# Patient Record
Sex: MALE | Race: OTHER | NOT HISPANIC OR LATINO | URBAN - METROPOLITAN AREA
[De-identification: names, ages, dates, MRNs, and addresses within clinical notes are randomized per-mention and may not be internally consistent; named-entity substitution may affect disease eponyms.]

---

## 2019-02-01 VITALS
RESPIRATION RATE: 17 BRPM | HEIGHT: 66 IN | TEMPERATURE: 98 F | SYSTOLIC BLOOD PRESSURE: 116 MMHG | OXYGEN SATURATION: 96 % | HEART RATE: 85 BPM | DIASTOLIC BLOOD PRESSURE: 69 MMHG | WEIGHT: 235.01 LBS

## 2019-02-01 NOTE — H&P ADULT - ASSESSMENT
52 y/o male with PMHx of HTN, HLD, pAfib (s/p DCCV in 2018, on Eliquis, last dose 2/2/19), CAD (last cath @ [facility] on 1/30/19 s/p PTCA to p/m LAD ISR), DM II, hypothyroidism (on synthroid) who presented to Cardiologist Dr. Ivan c/o worsening unstable angina symptoms x 3months. In light of pt's risk factors, CCS Class III Anginal symptoms, severe recurrent ISR of p/m LAD, pt referred for laser atherectomy with possible brachytherapy at [facility].     ASA Class: III	 	Mallampatti Class: III    - NKDA  - Meds verified with pharmacy   - pre-cath hydration   - ISS/FS  - 325mg ASA + 75mg Plavix given (pt reported being compliant with all daily medications   - Risks & benefits of procedure and alternative therapy have been explained to the patient including but not limited to: allergic reaction, bleeding w/possible need for blood transfusion, infection, renal and vascular compromise, limb damage, arrhythmia, stroke, vessel dissection/perforation, Myocardial infarction, emergent CABG. Informed consent obtained and in chart. 54 y/o male with PMHx of HTN, HLD, pAfib (s/p DCCV in 2018, on Eliquis, last dose 2/2/19), CAD (last cath @ [facility] on 1/30/19 s/p PTCA to p/m LAD ISR), DM II, hypothyroidism (on synthroid) who presented to Cardiologist Dr. Ivan c/o worsening unstable angina symptoms x 3months. Cardiac cath @ [place] (1/30/19): LVEF 65%; pLAD 90% ISR (culprit), mLAD 85% at site of prior stent (culprit), mLCx 30%, s/p PTCA of p/m LAD. In light of pt's risk factors, CCS Class III Anginal symptoms, severe recurrent ISR of p/m LAD, pt referred to for laser atherectomy with possible brachytherapy at [facility].     ASA Class: III	 	Mallampatti Class: III    - NKDA  - Meds verified with pharmacy   - pre-cath hydration   - ISS/FS  - 325mg ASA + 75mg Plavix given (pt reported being compliant with all daily medications   - Risks & benefits of procedure and alternative therapy have been explained to the patient including but not limited to: allergic reaction, bleeding w/possible need for blood transfusion, infection, renal and vascular compromise, limb damage, arrhythmia, stroke, vessel dissection/perforation, Myocardial infarction, emergent CABG. Informed consent obtained and in chart. 52 y/o male with PMHx of HTN, HLD, pAfib (s/p DCCV in 2018, on Eliquis, last dose 2/2/19), CAD (last cath @ [facility] on 1/30/19 s/p PTCA to p/m LAD ISR), DM II, hypothyroidism (on synthroid) who presented to Cardiologist Dr. Ivan c/o worsening unstable angina symptoms x 3months. Cardiac cath @ [place] (1/30/19): LVEF 65%; pLAD 90% ISR, mLAD 85% at site of prior stent, mLCx 30%, s/p PTCA of p/m LAD. In light of pt's risk factors, CCS Class III Anginal symptoms, severe recurrent ISR of p/m LAD, pt referred to for laser atherectomy with possible brachytherapy at [facility].     ASA Class: III	 	Mallampatti Class: III    - NKDA  - Meds verified with pharmacy   - pre-cath hydration   - ISS/FS  - 325mg ASA + 75mg Plavix given (Eliquis last dose on 2/3/19). Patient reports being compliant with all daily medications.  - Risks & benefits of procedure and alternative therapy have been explained to the patient including but not limited to: allergic reaction, bleeding w/possible need for blood transfusion, infection, renal and vascular compromise, limb damage, arrhythmia, stroke, vessel dissection/perforation, Myocardial infarction, emergent CABG. Informed consent obtained and in chart.

## 2019-02-01 NOTE — H&P ADULT - NSHPSOCIALHISTORY_GEN_ALL_CORE
Former smoker (quit 7/2014)  Drinks socially  Denies elicit drug use Current smoker (quit 13 years ago, couple months, few cigarettes per day)  Drinks socially (on weekends, last drink New Years abiodun)  Denies elicit drug use

## 2019-02-01 NOTE — H&P ADULT - NSHPLABSRESULTS_GEN_ALL_CORE
EKG: NSR @ 84bpm. Normal Axis. RBBB, Flattened T waves + J point elevations in leads II/III/AVF. TWI in leads V2.                          13.1   6.7   )-----------( 289      ( 05 Feb 2019 07:32 )             40.0   02-05    137  |  103  |  26<H>  ----------------------------<  369<H>  4.7   |  21<L>  |  0.96    Ca    9.5      05 Feb 2019 07:32    TPro  7.7  /  Alb  4.2  /  TBili  0.3  /  DBili  x   /  AST  16  /  ALT  19  /  AlkPhos  70  02-05    PT/INR - ( 05 Feb 2019 07:32 )   PT: 11.2 sec;   INR: 0.99          PTT - ( 05 Feb 2019 07:32 )  PTT:27.8 sec

## 2019-02-01 NOTE — H&P ADULT - FAMILY HISTORY
Father  Still living? Unknown  Family history of heart failure, Age at diagnosis: Age Unknown Father  Still living? Unknown  Family history of heart failure, Age at diagnosis: Age Unknown  Family history of myocardial infarction, Age at diagnosis: 61-70

## 2019-02-01 NOTE — H&P ADULT - HISTORY OF PRESENT ILLNESS
**VERIFY MEDS  **Verify medication    52 yo male, FORMER SMOKER,  with PMHx of HTN, pAfib (on Eliquis), CAD, pSVT, DM who presented to Cardiologist Dr. Ivan with c/o non-radiating SSCP described as ___ and of _/10 intensity with associated BLANCHARD when walking short distance in very cold weather over past few months. Pt also c/o BLANCHARD upon ambulation of 1 flight of steps, resolving with rest, over past 6 months.    Cath hx:  Cardiac cath @ [place] on 1/30/19: pLAD 90% ISR (culprit), mLAD 85% at site of prior stent (culprit), mLCx 30%, LVEF 65% and pt with history of recurrent ISR of prox and middle LAD stents which were last treated with PTCA 9/2018 with plan for laser atherectomy with possible brachytherapy at [facility]    Cardiac cath @ [place] 9/7/18: pLAD 95% ISR --> 0%, post intervention lumen 5.5-6 mm2, mLAD 90% s/p WALLY to 0% **Verify medications    54 yo male with PMHx of HTN, HLD, pAfib (s/p DCCV in 2018, on Eliquis, last dose 2/2/19), CAD (last cath @ [facility] on 1/30/19 s/p PTCA to p/m LAD ISR), DM II, hypothyroidism (on synthroid) who presented to Cardiologist Dr. Ivan with c/o non-radiating SSCP described as pressure and of moderate intensity with associated LBANCHARD when walking <3 city blocks in very cold weather over past couple months. Pt also c/o non-radiating SSCP described as pressure but of mild intensity with associated BLANCHARD upon ambulation of 1 flight of steps, resolving with rest, over past 6 months.  Furthermore, pt endorses intermittent episodes of lightheadedness and feeling off balance when standing up quickly and feeling of generalized fatigue x several months. Denies diaphoresis, palpitations, LE edema, orthopnea, PND, syncope, N/V, abdominal pain.  In light of pt's risk factors, CCS Class III Anginal symptoms, severe recurrent ISR of p/m LAD, pt referred for laser atherectomy with possible brachytherapy at [facility].     Cath hx:  Cardiac cath @ [place] on 1/30/19: pLAD 90% ISR (culprit), mLAD 85% at site of prior stent (culprit), mLCx 30%, s/p PTCA of p/m LAD, LVEF 65%. Plan for laser atherectomy with possible brachytherapy at [facility]    Cardiac cath @ [place] 9/7/18: pLAD 95% ISR --> 0%, post intervention lumen 5.5-6 mm2, mLAD 90% s/p WALLY to 0% 54 y/o male with PMHx of HTN, HLD, pAfib (s/p DCCV in 2018, on Eliquis, last dose 2/2/19), CAD (last cath @ [facility] on 1/30/19 s/p PTCA to p/m LAD ISR), DM II, hypothyroidism (on synthroid) who presented to Cardiologist Dr. Ivan with c/o non-radiating SSCP described as pressure and of moderate intensity with associated BLANCHARD when walking <3 city blocks in very cold weather over past couple months. Pt also c/o non-radiating SSCP described as pressure but of mild intensity with associated BLANCHARD upon ambulation of 1 flight of steps, resolving with rest, over past 6 months.  Furthermore, pt endorses intermittent episodes of lightheadedness and feeling off balance when standing up quickly and feeling of generalized fatigue x several months. Denies diaphoresis, palpitations, LE edema, orthopnea, PND, syncope, N/V, abdominal pain.  In light of pt's risk factors, CCS Class III Anginal symptoms, severe recurrent ISR of p/m LAD, pt referred for laser atherectomy with possible brachytherapy at [facility].     Cath hx:  Cardiac cath @ [place] on 1/30/19: pLAD 90% ISR (culprit), mLAD 85% at site of prior stent (culprit), mLCx 30%, s/p PTCA of p/m LAD, LVEF 65%. Plan for laser atherectomy with possible brachytherapy at [facility]    Cardiac cath @ [place] 9/7/18: pLAD 95% ISR --> 0%, post intervention lumen 5.5-6 mm2, mLAD 90% s/p WALLY to 0% 54 y/o male with PMHx of HTN, HLD, pAfib (s/p DCCV in 2018, on Eliquis, last dose 2/2/19), CAD (last cath @ [facility] on 1/30/19 s/p PTCA to p/m LAD ISR), DM II, hypothyroidism (on synthroid) who presented to Cardiologist Dr. Ivan c/o worsening unstable angina x 1 month. Pt reports having moderate, intermittent, non-radiating, mid-sternal chest "pressure" with associated BLANCHARD. Pt reports he is able to when walk only <3 city blocks + up 1 flight of stairs in very cold weather before his symptoms start, but are relieved/do not occur with rest.  Furthermore, pt endorses intermittent episodes of lightheadedness/feeling off balance when standing up quickly and generalized fatigue x several months. Pt reported having CP this morning, took his Nitro SL at 8am with relief of chest pain. Denies diaphoresis, palpitations, BLE edema/pain, orthopnea, PND, syncope, N/V, and abdominal pain. In light of pt's risk factors, CCS Class III Anginal symptoms, severe recurrent ISR of p/m LAD, pt referred to [facility] for laser atherectomy with possible brachytherapy.    Cath hx:  Cardiac cath @ [place] (1/30/19): LVEF 65%, pLAD 90% ISR (culprit), mLAD 85% at site of prior stent (culprit), mLCx 30% s/p PTCA of p/m LAD.    Cardiac cath @ [place] (9/7/18): pLAD 95% ISR --> 0%, post intervention lumen 5.5-6 mm2, mLAD 90% s/p WALLY to 0%

## 2019-02-01 NOTE — H&P ADULT - PMH
Afib    CAD (coronary artery disease)    DM (diabetes mellitus)    HTN (hypertension)    SVT (supraventricular tachycardia) Afib    CAD (coronary artery disease)    DM (diabetes mellitus)    HTN (hypertension) Afib    CAD (coronary artery disease)    DM (diabetes mellitus)    HLD (hyperlipidemia)    HTN (hypertension)    Hypothyroid

## 2019-02-01 NOTE — H&P ADULT - PSH
Amputation of finger, initial encounter  Traumatic amputation of thumb in 2014  Mandible fracture  s/p surgery Amputation of finger, initial encounter  Traumatic amputation of thumb in 2014  Mandible fracture  s/p surgery  S/P drug eluting coronary stent placement

## 2019-02-01 NOTE — H&P ADULT - ATTENDING COMMENTS
Reviewed above documentation. Reviewed vitals, labs, medications, cardiac studies and additional imaging 1/5/19. Agree with the above with the following additions/amendments:  53M with Essential HTN, HLD, pAfib, CAD, DM II, hypothyroidism (on synthroid) who presents for [facility] in context of anginal pectoris. Pt with severe recurrent ISR of p/m LAD, pt referred for laser atherectomy with possible brachytherapy at [facility]. Paitent s/p MARTIN LAD without complication. Groin stable. HDS, asx.     Plan for:  Triple tx with ASA/Plavix/Eliquis x1 mo  -->then continue Plavix Eliquis  High intensity statin Atorva   Imdur antianginal  Order HbA1c, TFTs, FLP  Patient to be referred to cardiac rehabilitation upon discharge for cardiac conditioning    ASHLEY Peralta.  Cardiology Attending

## 2019-02-05 ENCOUNTER — INPATIENT (INPATIENT)
Facility: HOSPITAL | Age: 54
LOS: 0 days | Discharge: HOME | DRG: 247 | End: 2019-02-06
Attending: INTERNAL MEDICINE | Admitting: INTERNAL MEDICINE
Payer: COMMERCIAL

## 2019-02-05 DIAGNOSIS — S68.119A COMPLETE TRAUMATIC METACARPOPHALANGEAL AMPUTATION OF UNSPECIFIED FINGER, INITIAL ENCOUNTER: Chronic | ICD-10-CM

## 2019-02-05 DIAGNOSIS — Z95.5 PRESENCE OF CORONARY ANGIOPLASTY IMPLANT AND GRAFT: Chronic | ICD-10-CM

## 2019-02-05 DIAGNOSIS — S02.609A FRACTURE OF MANDIBLE, UNSPECIFIED, INITIAL ENCOUNTER FOR CLOSED FRACTURE: Chronic | ICD-10-CM

## 2019-02-05 LAB
ALBUMIN SERPL ELPH-MCNC: 4.2 G/DL — SIGNIFICANT CHANGE UP (ref 3.3–5)
ALP SERPL-CCNC: 70 U/L — SIGNIFICANT CHANGE UP (ref 40–120)
ALT FLD-CCNC: 19 U/L — SIGNIFICANT CHANGE UP (ref 10–45)
ANION GAP SERPL CALC-SCNC: 13 MMOL/L — SIGNIFICANT CHANGE UP (ref 5–17)
APTT BLD: 27.8 SEC — SIGNIFICANT CHANGE UP (ref 27.5–36.3)
AST SERPL-CCNC: 16 U/L — SIGNIFICANT CHANGE UP (ref 10–40)
BASOPHILS NFR BLD AUTO: 0.3 % — SIGNIFICANT CHANGE UP (ref 0–2)
BILIRUB SERPL-MCNC: 0.3 MG/DL — SIGNIFICANT CHANGE UP (ref 0.2–1.2)
BUN SERPL-MCNC: 26 MG/DL — HIGH (ref 7–23)
CALCIUM SERPL-MCNC: 9.5 MG/DL — SIGNIFICANT CHANGE UP (ref 8.4–10.5)
CHLORIDE SERPL-SCNC: 103 MMOL/L — SIGNIFICANT CHANGE UP (ref 96–108)
CHOLEST SERPL-MCNC: 124 MG/DL — SIGNIFICANT CHANGE UP (ref 10–199)
CK MB CFR SERPL CALC: 2.4 NG/ML — SIGNIFICANT CHANGE UP (ref 0–6.7)
CK SERPL-CCNC: 96 U/L — SIGNIFICANT CHANGE UP (ref 30–200)
CO2 SERPL-SCNC: 21 MMOL/L — LOW (ref 22–31)
CREAT SERPL-MCNC: 0.96 MG/DL — SIGNIFICANT CHANGE UP (ref 0.5–1.3)
CRP SERPL-MCNC: 0.14 MG/DL — SIGNIFICANT CHANGE UP (ref 0–0.4)
EOSINOPHIL NFR BLD AUTO: 2.1 % — SIGNIFICANT CHANGE UP (ref 0–6)
GLUCOSE BLDC GLUCOMTR-MCNC: 230 MG/DL — HIGH (ref 70–99)
GLUCOSE SERPL-MCNC: 369 MG/DL — HIGH (ref 70–99)
HCT VFR BLD CALC: 40 % — SIGNIFICANT CHANGE UP (ref 39–50)
HDLC SERPL-MCNC: 32 MG/DL — LOW
HGB BLD-MCNC: 13.1 G/DL — SIGNIFICANT CHANGE UP (ref 13–17)
INR BLD: 0.99 — SIGNIFICANT CHANGE UP (ref 0.88–1.16)
LIPID PNL WITH DIRECT LDL SERPL: 62 MG/DL — SIGNIFICANT CHANGE UP
LYMPHOCYTES # BLD AUTO: 24.1 % — SIGNIFICANT CHANGE UP (ref 13–44)
MCHC RBC-ENTMCNC: 25 PG — LOW (ref 27–34)
MCHC RBC-ENTMCNC: 32.8 G/DL — SIGNIFICANT CHANGE UP (ref 32–36)
MCV RBC AUTO: 76.5 FL — LOW (ref 80–100)
MONOCYTES NFR BLD AUTO: 6.8 % — SIGNIFICANT CHANGE UP (ref 2–14)
NEUTROPHILS NFR BLD AUTO: 66.7 % — SIGNIFICANT CHANGE UP (ref 43–77)
PLATELET # BLD AUTO: 289 K/UL — SIGNIFICANT CHANGE UP (ref 150–400)
POTASSIUM SERPL-MCNC: 4.7 MMOL/L — SIGNIFICANT CHANGE UP (ref 3.5–5.3)
POTASSIUM SERPL-SCNC: 4.7 MMOL/L — SIGNIFICANT CHANGE UP (ref 3.5–5.3)
PROT SERPL-MCNC: 7.7 G/DL — SIGNIFICANT CHANGE UP (ref 6–8.3)
PROTHROM AB SERPL-ACNC: 11.2 SEC — SIGNIFICANT CHANGE UP (ref 10–12.9)
RBC # BLD: 5.23 M/UL — SIGNIFICANT CHANGE UP (ref 4.2–5.8)
RBC # FLD: 13.9 % — SIGNIFICANT CHANGE UP (ref 10.3–16.9)
SODIUM SERPL-SCNC: 137 MMOL/L — SIGNIFICANT CHANGE UP (ref 135–145)
TOTAL CHOLESTEROL/HDL RATIO MEASUREMENT: 3.9 RATIO — SIGNIFICANT CHANGE UP (ref 3.4–9.6)
TRIGL SERPL-MCNC: 151 MG/DL — HIGH (ref 10–149)
WBC # BLD: 6.7 K/UL — SIGNIFICANT CHANGE UP (ref 3.8–10.5)
WBC # FLD AUTO: 6.7 K/UL — SIGNIFICANT CHANGE UP (ref 3.8–10.5)

## 2019-02-05 PROCEDURE — 99222 1ST HOSP IP/OBS MODERATE 55: CPT

## 2019-02-05 PROCEDURE — 92928 PRQ TCAT PLMT NTRAC ST 1 LES: CPT | Mod: LD

## 2019-02-05 PROCEDURE — 92978 ENDOLUMINL IVUS OCT C 1ST: CPT | Mod: 26

## 2019-02-05 PROCEDURE — 76937 US GUIDE VASCULAR ACCESS: CPT | Mod: 26

## 2019-02-05 PROCEDURE — 93454 CORONARY ARTERY ANGIO S&I: CPT | Mod: 26,XU

## 2019-02-05 RX ORDER — ISOSORBIDE MONONITRATE 60 MG/1
30 TABLET, EXTENDED RELEASE ORAL DAILY
Qty: 0 | Refills: 0 | Status: DISCONTINUED | OUTPATIENT
Start: 2019-02-06 | End: 2019-02-06

## 2019-02-05 RX ORDER — SODIUM CHLORIDE 9 MG/ML
1000 INJECTION, SOLUTION INTRAVENOUS
Qty: 0 | Refills: 0 | Status: DISCONTINUED | OUTPATIENT
Start: 2019-02-05 | End: 2019-02-06

## 2019-02-05 RX ORDER — DEXTROSE 50 % IN WATER 50 %
25 SYRINGE (ML) INTRAVENOUS ONCE
Qty: 0 | Refills: 0 | Status: DISCONTINUED | OUTPATIENT
Start: 2019-02-05 | End: 2019-02-06

## 2019-02-05 RX ORDER — DEXTROSE 50 % IN WATER 50 %
25 SYRINGE (ML) INTRAVENOUS ONCE
Qty: 0 | Refills: 0 | Status: DISCONTINUED | OUTPATIENT
Start: 2019-02-05 | End: 2019-02-05

## 2019-02-05 RX ORDER — ASPIRIN/CALCIUM CARB/MAGNESIUM 324 MG
81 TABLET ORAL DAILY
Qty: 0 | Refills: 0 | Status: DISCONTINUED | OUTPATIENT
Start: 2019-02-06 | End: 2019-02-06

## 2019-02-05 RX ORDER — GLUCAGON INJECTION, SOLUTION 0.5 MG/.1ML
1 INJECTION, SOLUTION SUBCUTANEOUS ONCE
Qty: 0 | Refills: 0 | Status: DISCONTINUED | OUTPATIENT
Start: 2019-02-05 | End: 2019-02-05

## 2019-02-05 RX ORDER — DILTIAZEM HCL 120 MG
120 CAPSULE, EXT RELEASE 24 HR ORAL DAILY
Qty: 0 | Refills: 0 | Status: DISCONTINUED | OUTPATIENT
Start: 2019-02-06 | End: 2019-02-06

## 2019-02-05 RX ORDER — CLOPIDOGREL BISULFATE 75 MG/1
75 TABLET, FILM COATED ORAL ONCE
Qty: 0 | Refills: 0 | Status: COMPLETED | OUTPATIENT
Start: 2019-02-05 | End: 2019-02-05

## 2019-02-05 RX ORDER — CHLORHEXIDINE GLUCONATE 213 G/1000ML
1 SOLUTION TOPICAL ONCE
Qty: 0 | Refills: 0 | Status: DISCONTINUED | OUTPATIENT
Start: 2019-02-05 | End: 2019-02-05

## 2019-02-05 RX ORDER — GLUCAGON INJECTION, SOLUTION 0.5 MG/.1ML
1 INJECTION, SOLUTION SUBCUTANEOUS ONCE
Qty: 0 | Refills: 0 | Status: DISCONTINUED | OUTPATIENT
Start: 2019-02-05 | End: 2019-02-06

## 2019-02-05 RX ORDER — DEXTROSE 50 % IN WATER 50 %
12.5 SYRINGE (ML) INTRAVENOUS ONCE
Qty: 0 | Refills: 0 | Status: DISCONTINUED | OUTPATIENT
Start: 2019-02-05 | End: 2019-02-06

## 2019-02-05 RX ORDER — INSULIN LISPRO 100/ML
VIAL (ML) SUBCUTANEOUS
Qty: 0 | Refills: 0 | Status: DISCONTINUED | OUTPATIENT
Start: 2019-02-05 | End: 2019-02-06

## 2019-02-05 RX ORDER — LEVOTHYROXINE SODIUM 125 MCG
50 TABLET ORAL DAILY
Qty: 0 | Refills: 0 | Status: DISCONTINUED | OUTPATIENT
Start: 2019-02-05 | End: 2019-02-06

## 2019-02-05 RX ORDER — METFORMIN HYDROCHLORIDE 850 MG/1
2 TABLET ORAL
Qty: 0 | Refills: 0 | COMMUNITY

## 2019-02-05 RX ORDER — ZOLPIDEM TARTRATE 10 MG/1
5 TABLET ORAL AT BEDTIME
Qty: 0 | Refills: 0 | Status: DISCONTINUED | OUTPATIENT
Start: 2019-02-05 | End: 2019-02-06

## 2019-02-05 RX ORDER — APIXABAN 2.5 MG/1
0 TABLET, FILM COATED ORAL
Qty: 0 | Refills: 0 | COMMUNITY

## 2019-02-05 RX ORDER — SODIUM CHLORIDE 9 MG/ML
1000 INJECTION, SOLUTION INTRAVENOUS
Qty: 0 | Refills: 0 | Status: DISCONTINUED | OUTPATIENT
Start: 2019-02-05 | End: 2019-02-05

## 2019-02-05 RX ORDER — SOTALOL HCL 120 MG
160 TABLET ORAL
Qty: 0 | Refills: 0 | Status: DISCONTINUED | OUTPATIENT
Start: 2019-02-06 | End: 2019-02-06

## 2019-02-05 RX ORDER — PANTOPRAZOLE SODIUM 20 MG/1
40 TABLET, DELAYED RELEASE ORAL
Qty: 0 | Refills: 0 | Status: DISCONTINUED | OUTPATIENT
Start: 2019-02-05 | End: 2019-02-06

## 2019-02-05 RX ORDER — APIXABAN 2.5 MG/1
5 TABLET, FILM COATED ORAL EVERY 12 HOURS
Qty: 0 | Refills: 0 | Status: DISCONTINUED | OUTPATIENT
Start: 2019-02-05 | End: 2019-02-06

## 2019-02-05 RX ORDER — INSULIN LISPRO 100/ML
VIAL (ML) SUBCUTANEOUS ONCE
Qty: 0 | Refills: 0 | Status: COMPLETED | OUTPATIENT
Start: 2019-02-05 | End: 2019-02-05

## 2019-02-05 RX ORDER — DEXTROSE 50 % IN WATER 50 %
15 SYRINGE (ML) INTRAVENOUS ONCE
Qty: 0 | Refills: 0 | Status: DISCONTINUED | OUTPATIENT
Start: 2019-02-05 | End: 2019-02-05

## 2019-02-05 RX ORDER — ASPIRIN/CALCIUM CARB/MAGNESIUM 324 MG
325 TABLET ORAL ONCE
Qty: 0 | Refills: 0 | Status: COMPLETED | OUTPATIENT
Start: 2019-02-05 | End: 2019-02-05

## 2019-02-05 RX ORDER — LISINOPRIL 2.5 MG/1
40 TABLET ORAL DAILY
Qty: 0 | Refills: 0 | Status: DISCONTINUED | OUTPATIENT
Start: 2019-02-06 | End: 2019-02-06

## 2019-02-05 RX ORDER — SODIUM CHLORIDE 9 MG/ML
500 INJECTION INTRAMUSCULAR; INTRAVENOUS; SUBCUTANEOUS
Qty: 0 | Refills: 0 | Status: DISCONTINUED | OUTPATIENT
Start: 2019-02-05 | End: 2019-02-05

## 2019-02-05 RX ORDER — DEXTROSE 50 % IN WATER 50 %
15 SYRINGE (ML) INTRAVENOUS ONCE
Qty: 0 | Refills: 0 | Status: DISCONTINUED | OUTPATIENT
Start: 2019-02-05 | End: 2019-02-06

## 2019-02-05 RX ORDER — DEXTROSE 50 % IN WATER 50 %
12.5 SYRINGE (ML) INTRAVENOUS ONCE
Qty: 0 | Refills: 0 | Status: DISCONTINUED | OUTPATIENT
Start: 2019-02-05 | End: 2019-02-05

## 2019-02-05 RX ORDER — FLUTICASONE PROPIONATE 50 MCG
2 SPRAY, SUSPENSION NASAL DAILY
Qty: 0 | Refills: 0 | Status: DISCONTINUED | OUTPATIENT
Start: 2019-02-05 | End: 2019-02-06

## 2019-02-05 RX ORDER — CLOPIDOGREL BISULFATE 75 MG/1
75 TABLET, FILM COATED ORAL DAILY
Qty: 0 | Refills: 0 | Status: DISCONTINUED | OUTPATIENT
Start: 2019-02-06 | End: 2019-02-06

## 2019-02-05 RX ORDER — ATORVASTATIN CALCIUM 80 MG/1
80 TABLET, FILM COATED ORAL AT BEDTIME
Qty: 0 | Refills: 0 | Status: DISCONTINUED | OUTPATIENT
Start: 2019-02-06 | End: 2019-02-06

## 2019-02-05 RX ADMIN — Medication 5: at 08:50

## 2019-02-05 RX ADMIN — SODIUM CHLORIDE 75 MILLILITER(S): 9 INJECTION INTRAMUSCULAR; INTRAVENOUS; SUBCUTANEOUS at 08:42

## 2019-02-05 RX ADMIN — APIXABAN 5 MILLIGRAM(S): 2.5 TABLET, FILM COATED ORAL at 18:02

## 2019-02-05 RX ADMIN — Medication 4: at 21:49

## 2019-02-05 RX ADMIN — Medication 2: at 17:19

## 2019-02-05 RX ADMIN — Medication 325 MILLIGRAM(S): at 08:43

## 2019-02-05 RX ADMIN — CLOPIDOGREL BISULFATE 75 MILLIGRAM(S): 75 TABLET, FILM COATED ORAL at 08:43

## 2019-02-05 NOTE — CONSULT NOTE ADULT - SUBJECTIVE AND OBJECTIVE BOX
Preventive Cardiology Consultation Note    Cardiologist - Dr. Ivan     CHIEF COMPLAINT: s/p cardiac catheterization requiring cardiovascular prevention optimization and education    HISTORY OF PRESENT ILLNESS: 54 y/o male, current smoker, with PMHx of HTN, HLD, pAfib, CAD (last cath @ [facility] on 1/30/19 s/p PTCA to p/m LAD ISR), DM II, hypothyroidism. Patient is now s/p cardiac cath today (2/5/19): MARTIN pLAD 95% ISR s/p laser atherectomy and balloon, MARTIN mLAD 90% ISR s/p laser atherectomy and balloon, LCx 30%, LMCA widely patent.    Review of systems otherwise negative.     Lifestyle History:  Mediterranean Diet Score (9 question survey) was 5.   (8-9: optimal, 6-7: near-optimal, 4-5: suboptimal, 0-3: markedly suboptimal)  Exercise: Patient denies any regular exercise other than walking necessary for daily activities.   Smoking: Current smoker (tried to quit 13 years ago, only lasted for a couple of months; now smokes 3-5 cigarettes per day).   Stress: Patient denies any stress.     PAST MEDICAL & SURGICAL HISTORY:  Hypothyroid  HLD (hyperlipidemia)  DM (diabetes mellitus)  Afib  HTN (hypertension)  CAD (coronary artery disease)  S/P drug eluting coronary stent placement  Mandible fracture: s/p surgery  Amputation of finger, initial encounter: Traumatic amputation of thumb in 2014    FAMILY HISTORY:   Patient denies any first degree family history of premature CAD or CVA.     Allergies:   No Known Allergies      HOME MEDICATIONS:     Ambien 5 mg oral tablet: 1 tab(s) orally once a day (at bedtime) (05 Feb 2019 10:16)  atorvastatin 80 mg oral tablet: 1 tab(s) orally once a day (05 Feb 2019 10:16)  Cardizem  mg/24 hours oral capsule, extended release: 1 cap(s) orally once a day (05 Feb 2019 10:16)  clopidogrel 75 mg oral tablet: 1 tab(s) orally once a day (05 Feb 2019 10:16)  Ecotrin Adult Low Strength 81 mg oral delayed release tablet: 1 tab(s) orally once a day (05 Feb 2019 10:16)  Eliquis 5 mg oral tablet: 1 tab(s) orally 2 times a day (05 Feb 2019 10:16)  isosorbide mononitrate 30 mg oral tablet, extended release: 1 tab(s) orally once a day (in the morning) (05 Feb 2019 10:16)  levothyroxine 50 mcg (0.05 mg) oral tablet: 1 tab(s) orally once a day (05 Feb 2019 10:16)  metFORMIN 1000 mg oral tablet, extended release: 1 tab(s) orally 2 times a day (05 Feb 2019 10:16)  mometasone 50 mcg/inh nasal spray: 2 spray(s) nasal once a day (05 Feb 2019 10:16)  nitroglycerin 0.4 mg sublingual tablet: 1 tab(s) sublingual every 5 minutes, As Needed (05 Feb 2019 10:16)  Protonix 40 mg oral delayed release tablet: 1 tab(s) orally once a day (05 Feb 2019 10:16)  ramipril 10 mg oral capsule: 1 cap(s) orally once a day (05 Feb 2019 10:16)  sotalol 160 mg oral tablet: 1 tab(s) orally 2 times a day (05 Feb 2019 10:16)      PHYSICAL EXAM:  · Temp (F)	98.5 Degrees F	  · Temp (C)	36.9 Degrees C	  · Heart Rate	85 /min	  · Respiration Rate (breaths/min)	17 /min	  · BP Systolic	116 mm Hg	  · BP Diastolic	69 mm Hg	  · Blood Pressure - Method	electronic	  · BP Noninvasive Mean	84 mm Hg	  · SpO2 (%)	96 %	  · O2 delivery	room air	    Height (cm): 167.64 (02-05 @ 09:19)  Weight (kg): 106.6 (02-05 @ 09:19)  BMI (kg/m2): 37.9 (02-05 @ 09:19)  	  Gen- awake, conversive  Head-NCAT; eyes: no corneal arcus noted b/l; no xanthelasmas   Neck- no thyromegaly, no cervical LAD, no JVD, no carotid bruit b/l  Respiratory- good air entry b/l, no WRR  Cardiovascular- S1S2, RRR, no MRG appr, no LE edema b/l, distal pulses 2+ b/l  Gastrointestinal- no HSM, no masses  Neurology- moves all extremities, no focal deficits  Psych- normal affect, non-depressed mood  Skin- multiple healing scabs/scars to b/l LE, no rashes, no xanthomas     LABS:	                          13.1   6.7   )-----------( 289      ( 05 Feb 2019 07:32 )             40.0     02-05    137  |  103  |  26<H>  ----------------------------<  369<H>  4.7   |  21<L>  |  0.96    Ca    9.5      05 Feb 2019 07:32    TPro  7.7  /  Alb  4.2  /  TBili  0.3  /  DBili  x   /  AST  16  /  ALT  19  /  AlkPhos  70  02-05      Cholesterol, Serum: 124 mg/dL (02-05 @ 07:32)  HDL Cholesterol, Serum: 32 mg/dL (02-05 @ 07:32)  Triglycerides, Serum: 151 mg/dL (02-05 @ 07:32)  Direct LDL: 62 mg/dL (02-05 @ 07:32)    C-Reactive Protein, Serum: 0.14 mg/dL (02-05 @ 07:32)      ASSESSMENT/RECOMMENDATIONS: 	  Patient's dietary, exercise and overall lifestyle habits were reviewed. The concept of atherosclerosis and its systemic nature was discussed with a focus on the need to get all cardiovascular risk factors to goal. At this time, I would like to make the following recommendations to optimize atherosclerotic risk factors.     RECOMMENDATIONS:   Anti-platelet Therapy: DAPT per interventionalist recommendation.   Lipid Therapy: Patient is currently taking atorvastatin 80mg daily and is compliant and tolerating it well. We believe this is an appropriate medication for him at this time. Depending on improvements from lifestyle modifications, it would be reasonable to consider adding Zetia 10mg daily to further optimize his medication regimen in the future, if needed.   Hypertension: Blood pressures during this stay were well-controlled.   Mediterranean Diet Score is 5. Some suggestions include continue incorporating 2 or more servings per day of vegetables, fruits, and whole grains. Increase intake of fish and legumes/beans to 2 or more servings per week. Aim to increase intake of healthy fats, such as olive oil and avocados, and have a handful of nuts/seeds most days. Reduce red/processed meat consumption to 2 or fewer times per week.   Exercise: Recommended gradually increasing activity to 30-45 minutes most days of the week once cleared by referring cardiologist. Cardiac rehab might benefit this patient and is covered by major insurance plans (other than co-pays), please refer.   Medication Adherence: Patient has no issues with adherence at this time.   Smoking: Smoking cessation was encouraged and discussed with the patient. We recommend following up with his PCP for further assistance.   Obesity/Overweight: The patient was advised about specific mechanisms such as reduced portions and increased activity for efforts toward weight loss.   Glucometabolic State: Recent HbA1c was unavailable at this time. We would recommend following up with his PCP for further evaluation. In general, we would recommend the possibility of a GLP-1 agonist or SGLT-2 inhibitor, as these newer agents have cardiovascular benefits as well as glucose control.   Sleep Apnea: The patient is at low risk for sleep apnea.   Psychological Stress: The patient appears to be coping with stressors well at this time.     Thank you for the opportunity to see this patient. Please feel free to contact Prevention if there are any questions, or if you feel that your patient would benefit from continued follow-up visits with the Program.    I am acting as a scribe on behalf of Dr. Lindsey Guerin,    Ivanna Connolly, [facility]  Cardiovascular Prevention     Lindsey Guerin MD  System Director, Cardiovascular Prevention

## 2019-02-05 NOTE — PATIENT PROFILE ADULT - NSPROMEDSPATCH_GEN_A_NUR
Number Of Freeze-Thaw Cycles: 1 freeze-thaw cycle Post-Care Instructions: The treatment site will redden, and this will be followed quickly by swelling and blistering. The burning sensation usually subsides promptly, but the patient may experience tenderness as long as 3 days. The patient may take Aspirin, Ibuprofen or Tylenol if needed for discomfort. The patient need not limit activities except for strenuous exercise such as gym classes when the growths are on the feet. Keep the area clean, you may wash the area with soap and water. Bandaging is not necessary, but may be done. You may also puncture a large blister to relieve pressure. Some growths will not be eliminated by one treatment, and will require additional treatment. Consent: Patient's verbal consent is given. Discussed possibility of redness, swelling, blistering and pain. Discussed possibility of hyper and hypopigmentation. Patient is aware treatment failure is also a possibility. Duration Of Freeze Thaw-Cycle (Seconds): 10 Detail Level: Detailed Render Post-Care Instructions In Note?: no none

## 2019-02-06 ENCOUNTER — TRANSCRIPTION ENCOUNTER (OUTPATIENT)
Age: 54
End: 2019-02-06

## 2019-02-06 VITALS — TEMPERATURE: 98 F

## 2019-02-06 LAB
ANION GAP SERPL CALC-SCNC: 11 MMOL/L — SIGNIFICANT CHANGE UP (ref 5–17)
BUN SERPL-MCNC: 18 MG/DL — SIGNIFICANT CHANGE UP (ref 7–23)
CALCIUM SERPL-MCNC: 9.1 MG/DL — SIGNIFICANT CHANGE UP (ref 8.4–10.5)
CHLORIDE SERPL-SCNC: 103 MMOL/L — SIGNIFICANT CHANGE UP (ref 96–108)
CO2 SERPL-SCNC: 22 MMOL/L — SIGNIFICANT CHANGE UP (ref 22–31)
CREAT SERPL-MCNC: 0.79 MG/DL — SIGNIFICANT CHANGE UP (ref 0.5–1.3)
GLUCOSE SERPL-MCNC: 211 MG/DL — HIGH (ref 70–99)
HBA1C BLD-MCNC: 12 % — HIGH (ref 4–5.6)
HCT VFR BLD CALC: 39.5 % — SIGNIFICANT CHANGE UP (ref 39–50)
HGB BLD-MCNC: 12.6 G/DL — LOW (ref 13–17)
MAGNESIUM SERPL-MCNC: 1.7 MG/DL — SIGNIFICANT CHANGE UP (ref 1.6–2.6)
MCHC RBC-ENTMCNC: 25.1 PG — LOW (ref 27–34)
MCHC RBC-ENTMCNC: 31.9 GM/DL — LOW (ref 32–36)
MCV RBC AUTO: 78.8 FL — LOW (ref 80–100)
NRBC # BLD: 0 /100 WBCS — SIGNIFICANT CHANGE UP (ref 0–0)
PLATELET # BLD AUTO: 256 K/UL — SIGNIFICANT CHANGE UP (ref 150–400)
POTASSIUM SERPL-MCNC: 4.3 MMOL/L — SIGNIFICANT CHANGE UP (ref 3.5–5.3)
POTASSIUM SERPL-SCNC: 4.3 MMOL/L — SIGNIFICANT CHANGE UP (ref 3.5–5.3)
RBC # BLD: 5.01 M/UL — SIGNIFICANT CHANGE UP (ref 4.2–5.8)
RBC # FLD: 14 % — SIGNIFICANT CHANGE UP (ref 10.3–14.5)
SODIUM SERPL-SCNC: 136 MMOL/L — SIGNIFICANT CHANGE UP (ref 135–145)
WBC # BLD: 6.94 K/UL — SIGNIFICANT CHANGE UP (ref 3.8–10.5)
WBC # FLD AUTO: 6.94 K/UL — SIGNIFICANT CHANGE UP (ref 3.8–10.5)

## 2019-02-06 PROCEDURE — 99239 HOSP IP/OBS DSCHRG MGMT >30: CPT

## 2019-02-06 RX ORDER — INSULIN LISPRO 100/ML
12 VIAL (ML) SUBCUTANEOUS
Qty: 2 | Refills: 3 | OUTPATIENT
Start: 2019-02-06 | End: 2019-06-05

## 2019-02-06 RX ORDER — INSULIN GLULISINE 100 [IU]/ML
12 INJECTION, SOLUTION SUBCUTANEOUS
Qty: 1 | Refills: 3 | OUTPATIENT
Start: 2019-02-06 | End: 2019-06-05

## 2019-02-06 RX ORDER — INSULIN GLARGINE 100 [IU]/ML
32 INJECTION, SOLUTION SUBCUTANEOUS
Qty: 1 | Refills: 3 | OUTPATIENT
Start: 2019-02-06 | End: 2019-06-05

## 2019-02-06 RX ORDER — NITROGLYCERIN 6.5 MG
1 CAPSULE, EXTENDED RELEASE ORAL
Qty: 0 | Refills: 0 | COMMUNITY

## 2019-02-06 RX ORDER — ENOXAPARIN SODIUM 100 MG/ML
32 INJECTION SUBCUTANEOUS
Qty: 2 | Refills: 3
Start: 2019-02-06 | End: 2019-06-05

## 2019-02-06 RX ORDER — INSULIN DETEMIR 100/ML (3)
32 INSULIN PEN (ML) SUBCUTANEOUS
Qty: 2 | Refills: 3 | OUTPATIENT
Start: 2019-02-06 | End: 2019-06-05

## 2019-02-06 RX ORDER — ASPIRIN/CALCIUM CARB/MAGNESIUM 324 MG
1 TABLET ORAL
Qty: 30 | Refills: 11
Start: 2019-02-06 | End: 2020-01-31

## 2019-02-06 RX ORDER — ASPIRIN/CALCIUM CARB/MAGNESIUM 324 MG
1 TABLET ORAL
Qty: 0 | Refills: 0 | COMMUNITY

## 2019-02-06 RX ORDER — ATORVASTATIN CALCIUM 80 MG/1
1 TABLET, FILM COATED ORAL
Qty: 0 | Refills: 0 | COMMUNITY

## 2019-02-06 RX ORDER — INSULIN ASPART 100 [IU]/ML
12 INJECTION, SOLUTION SUBCUTANEOUS
Qty: 2 | Refills: 3
Start: 2019-02-06 | End: 2019-06-05

## 2019-02-06 RX ORDER — CLOPIDOGREL BISULFATE 75 MG/1
1 TABLET, FILM COATED ORAL
Qty: 0 | Refills: 0 | COMMUNITY

## 2019-02-06 RX ORDER — CLOPIDOGREL BISULFATE 75 MG/1
1 TABLET, FILM COATED ORAL
Qty: 30 | Refills: 11
Start: 2019-02-06 | End: 2020-01-31

## 2019-02-06 RX ORDER — ATORVASTATIN CALCIUM 80 MG/1
1 TABLET, FILM COATED ORAL
Qty: 30 | Refills: 3
Start: 2019-02-06 | End: 2019-06-05

## 2019-02-06 RX ORDER — MAGNESIUM SULFATE 500 MG/ML
1 VIAL (ML) INJECTION ONCE
Qty: 0 | Refills: 0 | Status: COMPLETED | OUTPATIENT
Start: 2019-02-06 | End: 2019-02-06

## 2019-02-06 RX ADMIN — Medication 100 GRAM(S): at 11:09

## 2019-02-06 RX ADMIN — Medication 120 MILLIGRAM(S): at 06:36

## 2019-02-06 RX ADMIN — Medication 4: at 06:37

## 2019-02-06 RX ADMIN — ISOSORBIDE MONONITRATE 30 MILLIGRAM(S): 60 TABLET, EXTENDED RELEASE ORAL at 06:41

## 2019-02-06 RX ADMIN — PANTOPRAZOLE SODIUM 40 MILLIGRAM(S): 20 TABLET, DELAYED RELEASE ORAL at 06:36

## 2019-02-06 RX ADMIN — CLOPIDOGREL BISULFATE 75 MILLIGRAM(S): 75 TABLET, FILM COATED ORAL at 11:09

## 2019-02-06 RX ADMIN — LISINOPRIL 40 MILLIGRAM(S): 2.5 TABLET ORAL at 11:09

## 2019-02-06 RX ADMIN — Medication 81 MILLIGRAM(S): at 11:09

## 2019-02-06 RX ADMIN — Medication 12: at 11:08

## 2019-02-06 RX ADMIN — APIXABAN 5 MILLIGRAM(S): 2.5 TABLET, FILM COATED ORAL at 06:36

## 2019-02-06 RX ADMIN — Medication 50 MICROGRAM(S): at 06:36

## 2019-02-06 RX ADMIN — Medication 160 MILLIGRAM(S): at 06:35

## 2019-02-06 NOTE — DISCHARGE NOTE ADULT - PATIENT PORTAL LINK FT
You can access the [facility] Patient Portal, offered by [facility], by registering with the following website: [email or web address]

## 2019-02-06 NOTE — DISCHARGE NOTE ADULT - HOSPITAL COURSE
53 yr old M with PMHx of HTN, hyperlipidemia, DM, hypothyroidism, pAfib, CAD (last cath @ [facility] on 1/30/19 s/p PTCA to p/m LAD ISR) with CCS Angina Class III equivalent symptoms who presented to [facility] for cardiac catheterization.   Patient s/p MARTIN pLAD 95% ISR s/p laser atherectomy and balloon, MARTIN mLAD 90% ISR s/p laser atherectomy and balloon, LCx 30%, LMCA widely patent.   Post procedure patient admitted to [facility] for cardiac telemetry.         GEN: well appearing elderly male in NAD  NECK: supple, no JVD  PULM: CTA B/L  CV: [id number]  ABD: +BS, soft, NT/ND  EXT: warm well perfused, no pedal edema  Right groin site: soft, NT, no hematoma, distal DP/PT pulses unchanged from baseline  NEURO: no focal neurodeficits noted    Labs/telemetry reviewed, within normal limits.      Patient now deemed stable for discharge as per Dr. Suazo and to follow-up with Dr. Ivan in 1-2 weeks. 53 yr old M with PMHx of HTN, hyperlipidemia, DM, hypothyroidism, pAfib, CAD (last cath @ [facility] on 1/30/19 s/p PTCA to p/m LAD ISR) with CCS Angina Class III equivalent symptoms who presented to [facility] for cardiac catheterization. Patient s/p MARTIN pLAD 95% ISR s/p laser atherectomy and balloon, MARTIN mLAD 90% ISR s/p laser atherectomy and balloon, LCx 30%, LMCA widely patent. Post procedure patient admitted to [facility] for cardiac telemetry.       GEN: well appearing elderly male in NAD  NECK: supple, no JVD  PULM: CTA B/L  CV: [id number]  ABD: +BS, soft, NT/ND  EXT: warm well perfused, no pedal edema  Right groin site: soft, NT, no hematoma, distal DP/PT pulses unchanged from baseline  NEURO: no focal neurodeficits noted    Labs/telemetry reviewed, within normal limits.    Patient now deemed stable for discharge as per Dr. Suazo and to follow-up with Dr. Ivan in 1-2 weeks.

## 2019-02-06 NOTE — DISCHARGE NOTE ADULT - CARE PROVIDERS DIRECT ADDRESSES
[email or web address]

## 2019-02-06 NOTE — CONSULT NOTE ADULT - SUBJECTIVE AND OBJECTIVE BOX
HPI: 53yMale    Age at Dx:  How dx:  Hx and duration of insulin:  Current Therapy:  Hx of hypoglycemia  Hx of DKA/HHS?    Home FSG:  Diet:    Complications:  Outpatient Endo:    PMH & Surgical Hx:CAD    I25.10  CAD  Family history of myocardial infarction (Father)  Family history of heart failure (Father)  Handoff  MEWS Score  Hypothyroid  HLD (hyperlipidemia)  SVT (supraventricular tachycardia)  DM (diabetes mellitus)  Afib  HTN (hypertension)  CAD (coronary artery disease)  CAD (coronary artery disease)  S/P drug eluting coronary stent placement  Mandible fracture  Amputation of finger, initial encounter  Hypothyroid  DM (diabetes mellitus)  HTN (hypertension)  Afib      FH:  DM:  Thyroid:  Autoimmune:  Other:    SH:  Smoking:  Etoh:  Recreational Drugs:  Social Life:    Current Meds:  apixaban 5 milliGRAM(s) Oral every 12 hours  aspirin enteric coated 81 milliGRAM(s) Oral daily  atorvastatin 80 milliGRAM(s) Oral at bedtime  clopidogrel Tablet 75 milliGRAM(s) Oral daily  dextrose 40% Gel 15 Gram(s) Oral once PRN  dextrose 5%. 1000 milliLiter(s) IV Continuous <Continuous>  dextrose 50% Injectable 12.5 Gram(s) IV Push once  dextrose 50% Injectable 25 Gram(s) IV Push once  dextrose 50% Injectable 25 Gram(s) IV Push once  diltiazem    milliGRAM(s) Oral daily  fluticasone propionate 50 MICROgram(s)/spray Nasal Spray 2 Spray(s) Both Nostrils daily  glucagon  Injectable 1 milliGRAM(s) IntraMuscular once PRN  insulin lispro (HumaLOG) corrective regimen sliding scale   SubCutaneous Before meals and at bedtime  isosorbide   mononitrate ER Tablet (IMDUR) 30 milliGRAM(s) Oral daily  levothyroxine 50 MICROGram(s) Oral daily  lisinopril 40 milliGRAM(s) Oral daily  pantoprazole    Tablet 40 milliGRAM(s) Oral before breakfast  sotalol 160 milliGRAM(s) Oral two times a day  zolpidem 5 milliGRAM(s) Oral at bedtime PRN      Allergies:  No Known Allergies      ROS:  Denies the following except as indicated.    General: weight loss/weight gain, decreased appetite, fatigue  Eyes: Blurry vision, double vision, visual changes  ENT: Throat pain, changes in voice,   CV: palpitations, SOB, CP, cough  GI: NVD, difficulty swallowing, abdominal pain  : polyuria, dysuria  Endo: abnormal menses, temperature intolerance, decreased libido  MSK: weakness, joint pain  Skin: rash, dryness, diaphoresis  Heme: Easy bruising,bleeding  Neuro: HA, dizziness, lightheadedness, numbness tingling  Psych: Anxiety, Depression    Vital Signs Last 24 Hrs  T(C): 36.4 (06 Feb 2019 14:13), Max: 37 (06 Feb 2019 06:23)  T(F): 97.5 (06 Feb 2019 14:13), Max: 98.6 (06 Feb 2019 06:23)  HR: 80 (06 Feb 2019 06:20) (80 - 90)  BP: 135/81 (06 Feb 2019 06:20) (124/69 - 145/73)  BP(mean): --  RR: 16 (06 Feb 2019 06:20) (16 - 18)  SpO2: 97% (05 Feb 2019 20:55) (97% - 98%)  Height (cm): 167.64 (02-05 @ 09:19)  Weight (kg): 106.6 (02-05 @ 09:19)  BMI (kg/m2): 37.9 (02-05 @ 09:19)    Constitutional: wn/wd in NAD.   HEENT: NCAT, MMM, OP clear, EOMI, , no proptosis or lid retraction  Neck: no thyromegaly or palpable thyroid nodules   Respiratory: lungs CTAB.  Cardiovascular: regular rhythm, normal S1 and S2, no audible murmurs, no peripheral edema  GI: soft, NT/ND, no masses/HSM appreciated.  Neurology: no tremors, DTR 2+  Skin: no visible rashes/lesions  Psychiatric: AAO x 3, normal affect/mood.  Ext: radial pulses intact, DP pulses intact, extremities warm, no cyanosis, clubbing or edema.       LABS:                        12.6   6.94  )-----------( 256      ( 06 Feb 2019 05:49 )             39.5     02-06    136  |  103  |  18  ----------------------------<  211<H>  4.3   |  22  |  0.79    Ca    9.1      06 Feb 2019 05:49  Mg     1.7     02-06    TPro  7.7  /  Alb  4.2  /  TBili  0.3  /  DBili  x   /  AST  16  /  ALT  19  /  AlkPhos  70  02-05    PT/INR - ( 05 Feb 2019 07:32 )   PT: 11.2 sec;   INR: 0.99          PTT - ( 05 Feb 2019 07:32 )  PTT:27.8 sec    Hemoglobin A1C, Whole Blood: 12.0 (02-06 @ 05:49)      Cholesterol, Serum: 124 mg/dL (02-05-19 @ 07:32)  HDL Cholesterol, Serum: 32 mg/dL (02-05-19 @ 07:32)  Triglycerides, Serum: 151 mg/dL (02-05-19 @ 07:32)  Direct LDL: 62 mg/dL (02-05-19 @ 07:32)    Cholesterol, Serum: 124 mg/dL (02-05-19 @ 07:32)  HDL Cholesterol, Serum: 32 mg/dL (02-05-19 @ 07:32)  Triglycerides, Serum: 151 mg/dL (02-05-19 @ 07:32)  Direct LDL: 62 mg/dL (02-05-19 @ 07:32)      CAPILLARY BLOOD GLUCOSE      POCT Blood Glucose.: 405 mg/dL (06 Feb 2019 10:43)  POCT Blood Glucose.: 201 mg/dL (06 Feb 2019 06:33)  POCT Blood Glucose.: 217 mg/dL (05 Feb 2019 21:10)  POCT Blood Glucose.: 199 mg/dL (05 Feb 2019 16:39)        A/P:53y Male    1.  DM  Please continue lantus       units at night / morning.  Please continue lispro      units before each meal.  Please continue lispro moderate / low dose sliding scale four times daily with meals and at bedtime    Please dilute all medications in NS, not in D5, including infusions when possible.   Please continue consistent carbohydrate diet while pt is eating.  Please obtain nutrition consult  Please ensure pt has correct FSG and insulin injection technique    Pt's fingerstick glucose goal is   For discharge, pt can continue    2.  Hyperlipidemia    3.  Obesity    Will continue to monitor     Pt is advised to follow up with me at discharge.  Please call  to make an appointment. HPI: 53yMale with 15 year hx of diabetes presenting for elective cardiac cath and now s/p placement of two stents with finding of severe hyperlgycemia and elevated HbA1C.    Pt reports that he was previously on insulin but stopped taking it because it was not covered by his insurance and because he did not feel that he was getting his diabetes 'cured'.  He now only takes metformin 500mg twice daily.  He does not check his glucose at home and he endorses a high carbohydrate diet and no regular physical activity.  he has no hx of hypoglycemia or admissions for DKA or HHS.  He follows with a primary care doctor, but previously has seen endocrinologists.  He denies hx of retinopathy, neuropathy, erectile dysfunction and denies blurry vision, numbness and tingling in his feet, nausea or vomiting after meals.  He endorses recent weight gain, denies excessive thirst, hunger, or polyuria.    He has a hx of hypothyroidism for which he is prescribed 50mcg synthroid once daily, and he does not know the etioglogy of his thyroid disease.   On presentation, pt's glucose was 369, and charla over 400.  He endorses eating only hospital food.  He was started on sliding scale and has not rec'd standing insulin while in the hospital.      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     Current Meds:  apixaban 5 milliGRAM(s) Oral every 12 hours  aspirin enteric coated 81 milliGRAM(s) Oral daily  atorvastatin 80 milliGRAM(s) Oral at bedtime  clopidogrel Tablet 75 milliGRAM(s) Oral daily  dextrose 40% Gel 15 Gram(s) Oral once PRN  dextrose 5%. 1000 milliLiter(s) IV Continuous <Continuous>  dextrose 50% Injectable 12.5 Gram(s) IV Push once  dextrose 50% Injectable 25 Gram(s) IV Push once  dextrose 50% Injectable 25 Gram(s) IV Push once  diltiazem    milliGRAM(s) Oral daily  fluticasone propionate 50 MICROgram(s)/spray Nasal Spray 2 Spray(s) Both Nostrils daily  glucagon  Injectable 1 milliGRAM(s) IntraMuscular once PRN  insulin lispro (HumaLOG) corrective regimen sliding scale   SubCutaneous Before meals and at bedtime  isosorbide   mononitrate ER Tablet (IMDUR) 30 milliGRAM(s) Oral daily  levothyroxine 50 MICROGram(s) Oral daily  lisinopril 40 milliGRAM(s) Oral daily  pantoprazole    Tablet 40 milliGRAM(s) Oral before breakfast  sotalol 160 milliGRAM(s) Oral two times a day  zolpidem 5 milliGRAM(s) Oral at bedtime PRN      Allergies:  No Known Allergies      ROS:  Denies the following except as indicated.    General: weight loss/weight gain, decreased appetite, fatigue  Eyes: Blurry vision, double vision, visual changes  ENT: Throat pain, changes in voice,   CV: palpitations, SOB, CP, cough  GI: NVD, difficulty swallowing, abdominal pain  : polyuria, dysuria  Endo: abnormal menses, temperature intolerance, decreased libido  MSK: weakness, joint pain  Skin: rash, dryness, diaphoresis  Heme: Easy bruising,bleeding  Neuro: HA, dizziness, lightheadedness, numbness tingling  Psych: Anxiety, Depression    Vital Signs Last 24 Hrs  T(C): 36.4 (06 Feb 2019 14:13), Max: 37 (06 Feb 2019 06:23)  T(F): 97.5 (06 Feb 2019 14:13), Max: 98.6 (06 Feb 2019 06:23)  HR: 80 (06 Feb 2019 06:20) (80 - 90)  BP: 135/81 (06 Feb 2019 06:20) (124/69 - 145/73)  BP(mean): --  RR: 16 (06 Feb 2019 06:20) (16 - 18)  SpO2: 97% (05 Feb 2019 20:55) (97% - 98%)  Height (cm): 167.64 (02-05 @ 09:19)  Weight (kg): 106.6 (02-05 @ 09:19)  BMI (kg/m2): 37.9 (02-05 @ 09:19)    Constitutional: wn/wd in NAD, obese  HEENT: NCAT, MMM, OP clear, EOMI, , no proptosis or lid retraction  Neck: no thyromegaly or palpable thyroid nodules   Respiratory: lungs CTAB.  Cardiovascular: regular rhythm, normal S1 and S2, no audible murmurs, no peripheral edema  GI: soft, NT/ND, no masses/HSM appreciated.  Neurology: no tremors, DTR 2+  Skin: no visible rashes/lesions  Psychiatric: AAO x 3, normal affect/mood.  Ext: radial pulses intact, DP pulses intact, extremities warm, no cyanosis, clubbing or edema.       LABS:                        12.6   6.94  )-----------( 256      ( 06 Feb 2019 05:49 )             39.5     02-06    136  |  103  |  18  ----------------------------<  211<H>  4.3   |  22  |  0.79    Ca    9.1      06 Feb 2019 05:49  Mg     1.7     02-06    TPro  7.7  /  Alb  4.2  /  TBili  0.3  /  DBili  x   /  AST  16  /  ALT  19  /  AlkPhos  70  02-05    PT/INR - ( 05 Feb 2019 07:32 )   PT: 11.2 sec;   INR: 0.99          PTT - ( 05 Feb 2019 07:32 )  PTT:27.8 sec    Hemoglobin A1C, Whole Blood: 12.0 (02-06 @ 05:49)      Cholesterol, Serum: 124 mg/dL (02-05-19 @ 07:32)  HDL Cholesterol, Serum: 32 mg/dL (02-05-19 @ 07:32)  Triglycerides, Serum: 151 mg/dL (02-05-19 @ 07:32)  Direct LDL: 62 mg/dL (02-05-19 @ 07:32)    CAPILLARY BLOOD GLUCOSE    POCT Blood Glucose.: 405 mg/dL (06 Feb 2019 10:43)  POCT Blood Glucose.: 201 mg/dL (06 Feb 2019 06:33)  POCT Blood Glucose.: 217 mg/dL (05 Feb 2019 21:10)  POCT Blood Glucose.: 199 mg/dL (05 Feb 2019 16:39)

## 2019-02-06 NOTE — DISCHARGE NOTE ADULT - CARE PROVIDER_API CALL
Maximus Ivan (DO)  Cardiovascular Disease; Interventional Cardiology  [address], [address] [place] [address], [place] [address]  Phone: [phone]  Fax: [phone]  Follow Up Time: Maximus Ivan (DO)  Cardiovascular Disease; Interventional Cardiology  [address], [address] [place] [id number]  Phone: [phone]  Fax: [phone]  Follow Up Time:     Consuelo James; PhD)  Internal Medicine  [address], [facility] [place] [address]  Phone: [phone]  Fax: [phone]  Follow Up Time:

## 2019-02-06 NOTE — PROGRESS NOTE ADULT - SUBJECTIVE AND OBJECTIVE BOX
INTERVENTIONAL CARDIOLOGY FOLLOW UP NOTE    -Patient seen and examined this am    -No events overnight    -No complaints this am    VASCULAR ACCESS EXAM:    FEMORAL:    2+ right/left femoral pulse    2+ DP/PT pulses.    -Right Access site clean, non-tender, without ecchymosis or hematoma.    A/P: 54 y/o male with PMHx of HTN, HLD, pAfib (s/p DCCV in 2018, on Eliquis, last dose 2/2/19), CAD (last cath @ [facility] on 1/30/19 s/p PTCA to p/m LAD ISR), DM II, hypothyroidism (on synthroid) now s/p PCI and laser atherectomy of proximal and mid LAD with MARTIN via right femoral approach with no evidence of vascular complications post procedure.    -continue with current medications including dual antiplatelet therapy    -discharge instructions as per protocol    -outpatient follow up with primary cardiologist    - consider brachytherapy of LAD as outpatient should chest pain recur

## 2019-02-06 NOTE — DISCHARGE NOTE ADULT - PLAN OF CARE
Continue current medications and DO NOT stop Aspirin or Plavix unless instructed by your cardiologist to prevent stent closure You had successful atherectomy/drug eluding stent's placed in your proximal and mid left anterior descending artery.  -- Continue Aspirin 81mg by mouth daily, Plavix 75mg by mouth daily,  Imdur ER 30mg by mouth daily, and Lipitor 80mg by mouth at bedtime.  -- Take Aspirin for 30 days after which you should continue only Plavix and Eliquis. Continue rate control and anticoagulation. Continue Eliquis 5mg by mouth every 12 hours, Sotalol 160mg by mouth twice daily and Cardizem CD 120mg by mouth daily Continue current medications and low sodium diet. Continue Ramipril 10mg by mouth daily Monitor fingersticks and continue current medications. Please HOLD Metformin for 48 hours, restart on Friday 2/8/2019. Continue Levothyroxine 50mcg by mouth daily. You had successful atherectomy/drug eluding stents placed in your proximal and mid left anterior descending artery.  -- Continue Aspirin 81mg by mouth daily, Plavix 75mg by mouth daily,  Imdur ER 30mg by mouth daily, and Lipitor 80mg by mouth at bedtime.  -- Take Aspirin for 30 days after which you should continue only Plavix and Eliquis. Monitor fingersticks and continue current medications. START taking Continue current medications and DO NOT stop Aspirin or Plavix (Clopidogrel) unless instructed by your cardiologist to prevent stent closure You had successful atherectomy/drug eluding stents placed in your proximal and mid left anterior descending artery.  -- Continue Aspirin 81mg by mouth daily, Plavix (Clopidogrel) 75mg by mouth daily,  Imdur ER 30mg by mouth daily, and Lipitor 80mg by mouth at bedtime.  -- Take Aspirin for 30 days after which you should continue only Plavix (Clopidogrel) 75mg daily and Eliquis (Apixaban) 5mg twice a day UNLESS DIRECTED OTHERWISE BY YOUR CARDIOLOGIST. Follow up with Dr Ivan in 1-2 weeks. Monitor fingersticks and continue current medications. START taking NOVOLOG (short acting insulin) 12 units three times a day before meals. START taking LANTUS (long acting insulin) 32 units at bedtime. Please check your blood sugar before injecting yourself. If it is less than 80 do not inject yourself with insulin and call your endocrinologist Please HOLD Metformin for 48 hours, restart on Friday 2/8/2019. Follow up with Dr ANN BACON in 2 weeks.

## 2019-02-06 NOTE — CONSULT NOTE ADULT - ATTENDING COMMENTS
A/P:53y Male with hx of DM presenting for management of CAD s/p PCI with severe hyperglycemia.     1.  DM - likely type 2, but would check c-peptide with AM labs if pt is to stay in the hospital.   Would start lantus 32 units once daily at bedtime, 11 units TID with meals, moderate dose scale with meals and at bedtime.   For dc, pt can continue this regimen along with metformin 1000mg twice daily and jardiance 10mg once daily.  Please dilute all medications in NS, not in D5, including infusions when possible.   Please continue consistent carbohydrate diet while pt is eating.  Please obtain nutrition consult  Please ensure pt has correct FSG and insulin injection technique    Pt's fingerstick glucose goal is 100-150      2.  Hyperlipidemia - LDL at goal, can continue current atorvastatin 80mg daily    3.  Obesity - d/w patient outpt tx with GLP-1 agonist and consideration of referral to bariatric surgery Danny Lorenzo    Pt is advised to follow up with me at discharge.  Please call  to make an appointment. A/P:53y Male with hx of DM presenting for management of CAD s/p PCI with severe hyperglycemia.     1.  DM - likely type 2, but would check c-peptide with AM labs if pt is to stay in the hospital.   Would start lantus 32 units once daily at bedtime, 11 units TID with meals, moderate dose scale with meals and at bedtime.   For dc, pt can continue this regimen along with metformin 1000mg twice daily and jardiance 10mg once daily.  Please dilute all medications in NS, not in D5, including infusions when possible.   Please continue consistent carbohydrate diet while pt is eating.  Please obtain nutrition consult  Please ensure pt has correct FSG and insulin injection technique    Pt's fingerstick glucose goal is 100-150      2.  Hyperlipidemia - LDL at goal, can continue current atorvastatin 80mg daily    3.  Obesity - d/w patient outpt tx with GLP-1 agonist and consideration of referral to bariatric surgery Danny Lorenzo    4.  Hypothyroidism - TSH at goal, can continue synthroid 50mcg daily    Pt is advised to follow up with me at discharge.  Please call  to make an appointment.

## 2019-02-06 NOTE — DISCHARGE NOTE ADULT - CARE PLAN
Principal Discharge DX:	CAD (coronary artery disease)  Goal:	Continue current medications and DO NOT stop Aspirin or Plavix unless instructed by your cardiologist to prevent stent closure  Assessment and plan of treatment:	You had successful atherectomy/drug eluding stent's placed in your proximal and mid left anterior descending artery.  -- Continue Aspirin 81mg by mouth daily, Plavix 75mg by mouth daily,  Imdur ER 30mg by mouth daily, and Lipitor 80mg by mouth at bedtime.  -- Take Aspirin for 30 days after which you should continue only Plavix and Eliquis.  Secondary Diagnosis:	Afib  Goal:	Continue rate control and anticoagulation.  Assessment and plan of treatment:	Continue Eliquis 5mg by mouth every 12 hours, Sotalol 160mg by mouth twice daily and Cardizem CD 120mg by mouth daily  Secondary Diagnosis:	HTN (hypertension)  Goal:	Continue current medications and low sodium diet.  Assessment and plan of treatment:	Continue Ramipril 10mg by mouth daily  Secondary Diagnosis:	DM (diabetes mellitus)  Goal:	Monitor fingersticks and continue current medications.  Assessment and plan of treatment:	Please HOLD Metformin for 48 hours, restart on Friday 2/8/2019.  Secondary Diagnosis:	Hypothyroid  Assessment and plan of treatment:	Continue Levothyroxine 50mcg by mouth daily. Principal Discharge DX:	CAD (coronary artery disease)  Goal:	Continue current medications and DO NOT stop Aspirin or Plavix unless instructed by your cardiologist to prevent stent closure  Assessment and plan of treatment:	You had successful atherectomy/drug eluding stents placed in your proximal and mid left anterior descending artery.  -- Continue Aspirin 81mg by mouth daily, Plavix 75mg by mouth daily,  Imdur ER 30mg by mouth daily, and Lipitor 80mg by mouth at bedtime.  -- Take Aspirin for 30 days after which you should continue only Plavix and Eliquis.  Secondary Diagnosis:	Afib  Goal:	Continue rate control and anticoagulation.  Assessment and plan of treatment:	Continue Eliquis 5mg by mouth every 12 hours, Sotalol 160mg by mouth twice daily and Cardizem CD 120mg by mouth daily  Secondary Diagnosis:	HTN (hypertension)  Goal:	Continue current medications and low sodium diet.  Assessment and plan of treatment:	Continue Ramipril 10mg by mouth daily  Secondary Diagnosis:	DM (diabetes mellitus)  Goal:	Monitor fingersticks and continue current medications.  Assessment and plan of treatment:	Please HOLD Metformin for 48 hours, restart on Friday 2/8/2019.  Secondary Diagnosis:	Hypothyroid  Assessment and plan of treatment:	Continue Levothyroxine 50mcg by mouth daily. Principal Discharge DX:	CAD (coronary artery disease)  Goal:	Continue current medications and DO NOT stop Aspirin or Plavix unless instructed by your cardiologist to prevent stent closure  Assessment and plan of treatment:	You had successful atherectomy/drug eluding stents placed in your proximal and mid left anterior descending artery.  -- Continue Aspirin 81mg by mouth daily, Plavix 75mg by mouth daily,  Imdur ER 30mg by mouth daily, and Lipitor 80mg by mouth at bedtime.  -- Take Aspirin for 30 days after which you should continue only Plavix and Eliquis.  Secondary Diagnosis:	Afib  Goal:	Continue rate control and anticoagulation.  Assessment and plan of treatment:	Continue Eliquis 5mg by mouth every 12 hours, Sotalol 160mg by mouth twice daily and Cardizem CD 120mg by mouth daily  Secondary Diagnosis:	HTN (hypertension)  Goal:	Continue current medications and low sodium diet.  Assessment and plan of treatment:	Continue Ramipril 10mg by mouth daily  Secondary Diagnosis:	DM (diabetes mellitus)  Goal:	Monitor fingersticks and continue current medications. START taking  Assessment and plan of treatment:	Please HOLD Metformin for 48 hours, restart on Friday 2/8/2019.  Secondary Diagnosis:	Hypothyroid  Assessment and plan of treatment:	Continue Levothyroxine 50mcg by mouth daily. Principal Discharge DX:	CAD (coronary artery disease)  Goal:	Continue current medications and DO NOT stop Aspirin or Plavix (Clopidogrel) unless instructed by your cardiologist to prevent stent closure  Assessment and plan of treatment:	You had successful atherectomy/drug eluding stents placed in your proximal and mid left anterior descending artery.  -- Continue Aspirin 81mg by mouth daily, Plavix (Clopidogrel) 75mg by mouth daily,  Imdur ER 30mg by mouth daily, and Lipitor 80mg by mouth at bedtime.  -- Take Aspirin for 30 days after which you should continue only Plavix (Clopidogrel) 75mg daily and Eliquis (Apixaban) 5mg twice a day UNLESS DIRECTED OTHERWISE BY YOUR CARDIOLOGIST. Follow up with Dr Ivan in 1-2 weeks.  Secondary Diagnosis:	Afib  Goal:	Continue rate control and anticoagulation.  Assessment and plan of treatment:	Continue Eliquis 5mg by mouth every 12 hours, Sotalol 160mg by mouth twice daily and Cardizem CD 120mg by mouth daily  Secondary Diagnosis:	HTN (hypertension)  Goal:	Continue current medications and low sodium diet.  Assessment and plan of treatment:	Continue Ramipril 10mg by mouth daily  Secondary Diagnosis:	DM (diabetes mellitus)  Goal:	Monitor fingersticks and continue current medications. START taking NOVOLOG (short acting insulin) 12 units three times a day before meals. START taking LANTUS (long acting insulin) 32 units at bedtime. Please check your blood sugar before injecting yourself. If it is less than 80 do not inject yourself with insulin and call your endocrinologist  Assessment and plan of treatment:	Please HOLD Metformin for 48 hours, restart on Friday 2/8/2019. Follow up with Dr ANN BACON in 2 weeks.  Secondary Diagnosis:	Hypothyroid  Assessment and plan of treatment:	Continue Levothyroxine 50mcg by mouth daily.

## 2019-02-06 NOTE — DISCHARGE NOTE ADULT - INSTRUCTIONS
---You underwent a coronary angiogram and should wait 3 days before returning to ordinary activities.   ---The catheter from your groin was removed and you should remove the dressing in 24 hours.   ---You may shower once the dressing is removed, but avoid baths, hot tubs, or swimming for 5 days to prevent infection.   ---If you notice bleeding from the site, hardening and pain at the site, drainage or redness from the site, coolness/paleness of the extremity, swelling, or fever, please call [phone].

## 2019-02-06 NOTE — DISCHARGE NOTE ADULT - MEDICATION SUMMARY - MEDICATIONS TO TAKE
I will START or STAY ON the medications listed below when I get home from the hospital:    Ecotrin Adult Low Strength 81 mg oral delayed release tablet  -- 1 tab(s) by mouth once a day  -- Indication: For Coronary artery disase/KEEPING YOUR STENTS OPEN    ramipril 10 mg oral capsule  -- 1 cap(s) by mouth once a day  -- Indication: For Blood pressure control    isosorbide mononitrate 30 mg oral tablet, extended release  -- 1 tab(s) by mouth once a day (in the morning)  -- Indication: For Blood pressure control    sotalol 160 mg oral tablet  -- 1 tab(s) by mouth 2 times a day  -- Indication: For Keeping your heart in a normal rhythm    Cardizem  mg/24 hours oral capsule, extended release  -- 1 cap(s) by mouth once a day  -- Indication: For Keeping your heart in a normal rhythm    Eliquis 5 mg oral tablet  -- 1 tab(s) by mouth 2 times a day  -- Indication: For Blood thinner    metFORMIN 1000 mg oral tablet, extended release  -- 1 tab(s) by mouth 2 times a day  -- Indication: For Diabetes DO NOT TAKE UNTIL Friday 2/8/19    NovoLOG FlexPen 100 units/mL injectable solution  -- 12 unit(s) injectable 3 times a day (before meals)   -- Check with your doctor before becoming pregnant.  Do not drink alcoholic beverages when taking this medication.  Keep in refrigerator.  Do not freeze.  Obtain medical advice before taking any non-prescription drugs as some may affect the action of this medication.    -- Indication: For Diabetes    Levemir FlexTouch 100 units/mL subcutaneous solution  -- 32 unit(s) subcutaneous once a day (at bedtime)   -- Check with your doctor before becoming pregnant.  Do not drink alcoholic beverages when taking this medication.  Keep in refrigerator.  Do not freeze.    -- Indication: For Diabetes    atorvastatin 80 mg oral tablet  -- 1 tab(s) by mouth once a day  -- Indication: For Cholesterol control    clopidogrel 75 mg oral tablet  -- 1 tab(s) by mouth once a day  -- Indication: For Coronary artery disase/KEEPING YOUR STENTS OPEN    Ambien 5 mg oral tablet  -- 1 tab(s) by mouth once a day (at bedtime)  -- Indication: For Insomnia    mometasone 50 mcg/inh nasal spray  -- 2 spray(s) into nose once a day  -- Indication: For Nasal spay    Protonix 40 mg oral delayed release tablet  -- 1 tab(s) by mouth once a day  -- Indication: For Stomach acid control    levothyroxine 50 mcg (0.05 mg) oral tablet  -- 1 tab(s) by mouth once a day  -- Indication: For Low thyroid levels I will START or STAY ON the medications listed below when I get home from the hospital:    Ecotrin Adult Low Strength 81 mg oral delayed release tablet  -- 1 tab(s) by mouth once a day  -- Indication: For Coronary artery disase/KEEPING YOUR STENTS OPEN    ramipril 10 mg oral capsule  -- 1 cap(s) by mouth once a day  -- Indication: For Blood pressure control    isosorbide mononitrate 30 mg oral tablet, extended release  -- 1 tab(s) by mouth once a day (in the morning)  -- Indication: For Blood pressure control    sotalol 160 mg oral tablet  -- 1 tab(s) by mouth 2 times a day  -- Indication: For Keeping your heart in a normal rhythm    Cardizem  mg/24 hours oral capsule, extended release  -- 1 cap(s) by mouth once a day  -- Indication: For Keeping your heart in a normal rhythm    Eliquis 5 mg oral tablet  -- 1 tab(s) by mouth 2 times a day  -- Indication: For Blood thinner    metFORMIN 1000 mg oral tablet, extended release  -- 1 tab(s) by mouth 2 times a day  -- Indication: For Diabetes DO NOT TAKE UNTIL Friday 2/8/19    NovoLOG FlexPen 100 units/mL injectable solution  -- 12 unit(s) injectable 3 times a day (before meals)   -- Check with your doctor before becoming pregnant.  Do not drink alcoholic beverages when taking this medication.  Keep in refrigerator.  Do not freeze.  Obtain medical advice before taking any non-prescription drugs as some may affect the action of this medication.    -- Indication: For Diabetes    Lantus Solostar Pen 100 units/mL subcutaneous solution  -- 32 unit(s) subcutaneous once a day (at bedtime)   -- Do not drink alcoholic beverages when taking this medication.  It is very important that you take or use this exactly as directed.  Do not skip doses or discontinue unless directed by your doctor.  Keep in refrigerator.  Do not freeze.    -- Indication: For Diabetes    atorvastatin 80 mg oral tablet  -- 1 tab(s) by mouth once a day  -- Indication: For Cholesterol control    clopidogrel 75 mg oral tablet  -- 1 tab(s) by mouth once a day  -- Indication: For Coronary artery disase/KEEPING YOUR STENTS OPEN    Ambien 5 mg oral tablet  -- 1 tab(s) by mouth once a day (at bedtime)  -- Indication: For Insomnia    mometasone 50 mcg/inh nasal spray  -- 2 spray(s) into nose once a day  -- Indication: For Nasal spay    Protonix 40 mg oral delayed release tablet  -- 1 tab(s) by mouth once a day  -- Indication: For Stomach acid control    levothyroxine 50 mcg (0.05 mg) oral tablet  -- 1 tab(s) by mouth once a day  -- Indication: For Low thyroid levels

## 2019-02-11 DIAGNOSIS — Z95.5 PRESENCE OF CORONARY ANGIOPLASTY IMPLANT AND GRAFT: ICD-10-CM
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
## 2019-02-11 DIAGNOSIS — T82.855A STENOSIS OF CORONARY ARTERY STENT, INITIAL ENCOUNTER: ICD-10-CM

## 2019-02-11 DIAGNOSIS — E03.9 HYPOTHYROIDISM, UNSPECIFIED: ICD-10-CM

## 2019-02-11 DIAGNOSIS — I25.110 ATHEROSCLEROTIC HEART DISEASE OF NATIVE CORONARY ARTERY WITH UNSTABLE ANGINA PECTORIS: ICD-10-CM

## 2019-02-11 DIAGNOSIS — E11.65 TYPE 2 DIABETES MELLITUS WITH HYPERGLYCEMIA: ICD-10-CM

## 2019-02-11 DIAGNOSIS — I48.0 PAROXYSMAL ATRIAL FIBRILLATION: ICD-10-CM

## 2019-02-11 DIAGNOSIS — E78.5 HYPERLIPIDEMIA, UNSPECIFIED: ICD-10-CM
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
## 2019-02-11 DIAGNOSIS — F17.210 NICOTINE DEPENDENCE, CIGARETTES, UNCOMPLICATED: ICD-10-CM

## 2019-02-11 DIAGNOSIS — Z79.01 LONG TERM (CURRENT) USE OF ANTICOAGULANTS: ICD-10-CM

## 2019-02-11 DIAGNOSIS — I10 ESSENTIAL (PRIMARY) HYPERTENSION: ICD-10-CM

## 2019-02-11 DIAGNOSIS — I25.84 CORONARY ATHEROSCLEROSIS DUE TO CALCIFIED CORONARY LESION: ICD-10-CM

## 2019-02-11 DIAGNOSIS — E66.9 OBESITY, UNSPECIFIED: ICD-10-CM

## 2019-02-20 PROCEDURE — 82550 ASSAY OF CK (CPK): CPT

## 2019-02-20 PROCEDURE — C1874: CPT

## 2019-02-20 PROCEDURE — 83036 HEMOGLOBIN GLYCOSYLATED A1C: CPT

## 2019-02-20 PROCEDURE — 82553 CREATINE MB FRACTION: CPT

## 2019-02-20 PROCEDURE — 85610 PROTHROMBIN TIME: CPT

## 2019-02-20 PROCEDURE — 80061 LIPID PANEL: CPT

## 2019-02-20 PROCEDURE — C1889: CPT

## 2019-02-20 PROCEDURE — 80053 COMPREHEN METABOLIC PANEL: CPT

## 2019-02-20 PROCEDURE — 85025 COMPLETE CBC W/AUTO DIFF WBC: CPT

## 2019-02-20 PROCEDURE — C1887: CPT

## 2019-02-20 PROCEDURE — 82962 GLUCOSE BLOOD TEST: CPT

## 2019-02-20 PROCEDURE — C1894: CPT

## 2019-02-20 PROCEDURE — 80048 BASIC METABOLIC PNL TOTAL CA: CPT

## 2019-02-20 PROCEDURE — 86140 C-REACTIVE PROTEIN: CPT

## 2019-02-20 PROCEDURE — 85730 THROMBOPLASTIN TIME PARTIAL: CPT

## 2019-02-20 PROCEDURE — C1769: CPT

## 2019-02-20 PROCEDURE — C1885: CPT

## 2019-02-20 PROCEDURE — 36415 COLL VENOUS BLD VENIPUNCTURE: CPT

## 2019-02-20 PROCEDURE — 85027 COMPLETE CBC AUTOMATED: CPT

## 2019-02-20 PROCEDURE — 83735 ASSAY OF MAGNESIUM: CPT

## 2019-02-20 PROCEDURE — C1725: CPT

## 2019-02-20 PROCEDURE — C1753: CPT

## 2019-02-20 PROCEDURE — 84443 ASSAY THYROID STIM HORMONE: CPT

## 2019-02-20 PROCEDURE — C1760: CPT

## 2020-09-10 PROBLEM — I48.91 UNSPECIFIED ATRIAL FIBRILLATION: Chronic | Status: ACTIVE | Noted: 2019-02-01

## 2020-09-10 PROBLEM — I25.10 ATHEROSCLEROTIC HEART DISEASE OF NATIVE CORONARY ARTERY WITHOUT ANGINA PECTORIS: Chronic | Status: ACTIVE | Noted: 2019-02-01

## 2020-09-10 PROBLEM — E03.9 HYPOTHYROIDISM, UNSPECIFIED: Chronic | Status: ACTIVE | Noted: 2019-02-05

## 2020-09-10 PROBLEM — E78.5 HYPERLIPIDEMIA, UNSPECIFIED: Chronic | Status: ACTIVE | Noted: 2019-02-05

## 2020-09-10 PROBLEM — I10 ESSENTIAL (PRIMARY) HYPERTENSION: Chronic | Status: ACTIVE | Noted: 2019-02-01

## 2020-09-10 PROBLEM — E11.9 TYPE 2 DIABETES MELLITUS WITHOUT COMPLICATIONS: Chronic | Status: ACTIVE | Noted: 2019-02-01

## 2020-10-27 ENCOUNTER — APPOINTMENT (OUTPATIENT)
Dept: VASCULAR SURGERY | Facility: CLINIC | Age: 55
End: 2020-10-27
Payer: COMMERCIAL

## 2020-10-27 DIAGNOSIS — I25.84 ATHEROSCLEROTIC HEART DISEASE OF NATIVE CORONARY ARTERY W/OUT ANGINA PECTORIS: ICD-10-CM

## 2020-10-27 DIAGNOSIS — Z78.9 OTHER SPECIFIED HEALTH STATUS: ICD-10-CM

## 2020-10-27 DIAGNOSIS — M79.605 PAIN IN RIGHT LEG: ICD-10-CM

## 2020-10-27 DIAGNOSIS — E11.9 TYPE 2 DIABETES MELLITUS W/OUT COMPLICATIONS: ICD-10-CM

## 2020-10-27 DIAGNOSIS — E03.9 HYPOTHYROIDISM, UNSPECIFIED: ICD-10-CM

## 2020-10-27 DIAGNOSIS — M79.604 PAIN IN RIGHT LEG: ICD-10-CM

## 2020-10-27 DIAGNOSIS — I70.213 ATHEROSCLEROSIS OF NATIVE ARTERIES OF EXTREMITIES WITH INTERMITTENT CLAUDICATION, BILATERAL LEGS: ICD-10-CM

## 2020-10-27 DIAGNOSIS — Z86.39 PERSONAL HISTORY OF OTHER ENDOCRINE, NUTRITIONAL AND METABOLIC DISEASE: ICD-10-CM

## 2020-10-27 DIAGNOSIS — I25.10 ATHEROSCLEROTIC HEART DISEASE OF NATIVE CORONARY ARTERY W/OUT ANGINA PECTORIS: ICD-10-CM

## 2020-10-27 PROCEDURE — 93923 UPR/LXTR ART STDY 3+ LVLS: CPT

## 2020-10-27 PROCEDURE — 99204 OFFICE O/P NEW MOD 45 MIN: CPT

## 2020-10-27 PROCEDURE — 99072 ADDL SUPL MATRL&STAF TM PHE: CPT

## 2020-10-27 RX ORDER — METFORMIN HYDROCHLORIDE 500 MG/1
500 TABLET, COATED ORAL
Refills: 0 | Status: ACTIVE | COMMUNITY

## 2020-10-27 RX ORDER — SOTALOL HYDROCHLORIDE 160 MG/1
160 TABLET ORAL
Refills: 0 | Status: ACTIVE | COMMUNITY

## 2020-10-27 RX ORDER — ATORVASTATIN CALCIUM 80 MG/1
80 TABLET, FILM COATED ORAL
Refills: 0 | Status: ACTIVE | COMMUNITY

## 2020-10-27 RX ORDER — CLOPIDOGREL BISULFATE 75 MG/1
75 TABLET, FILM COATED ORAL
Refills: 0 | Status: ACTIVE | COMMUNITY

## 2020-10-27 RX ORDER — LEVOTHYROXINE SODIUM 0.05 MG/1
50 TABLET ORAL
Refills: 0 | Status: ACTIVE | COMMUNITY

## 2020-10-27 RX ORDER — RAMIPRIL 10 MG/1
10 CAPSULE ORAL
Refills: 0 | Status: ACTIVE | COMMUNITY

## 2020-10-27 RX ORDER — DILTIAZEM HYDROCHLORIDE 240 MG/1
240 CAPSULE, COATED, EXTENDED RELEASE ORAL
Refills: 0 | Status: ACTIVE | COMMUNITY

## 2020-10-27 RX ORDER — APIXABAN 5 MG/1
5 TABLET, FILM COATED ORAL
Refills: 0 | Status: ACTIVE | COMMUNITY

## 2020-10-27 RX ORDER — ASPIRIN 81 MG
81 TABLET, DELAYED RELEASE (ENTERIC COATED) ORAL
Refills: 0 | Status: ACTIVE | COMMUNITY

## 2020-10-27 NOTE — REVIEW OF SYSTEMS
[As noted in HPI] : as noted in HPI [Leg Claudication] : intermittent leg claudication [As Noted in HPI] : as noted in HPI [Skin Lesions] : skin lesion [Skin Wound] : skin wound [Negative] : Heme/Lymph

## 2020-11-02 NOTE — REASON FOR VISIT
[Consultation] : a consultation visit [FreeTextEntry1] : L-S spine pain w/b/l leg radiation, fatigue in limbs
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
## 2020-11-02 NOTE — PROCEDURE
[FreeTextEntry1] : CHANTELL/PVRs demonstrate triphasic waveforms b/l, CHANTELL 1.31/1.22 L/R, respectively

## 2020-11-02 NOTE — PHYSICAL EXAM
[Normal Thyroid] : the thyroid was normal [Normal Breath Sounds] : Normal breath sounds [Normal Heart Sounds] : normal heart sounds [Normal Rate and Rhythm] : normal rate and rhythm [1+] : left 1+ [2+] : left 2+ [Skin Ulcer] : ulcer [Alert] : alert [Calm] : calm [JVD] : no jugular venous distention  [Carotid Bruits] : no carotid bruits [Right Carotid Bruit] : no bruit heard over the right carotid [Left Carotid Bruit] : no bruit heard over the left carotid [Ankle Swelling (On Exam)] : not present [Varicose Veins Of Lower Extremities] : not present [] : not present [Abdomen Masses] : No abdominal masses [Abdomen Tenderness] : ~T ~M No abdominal tenderness [Purpura] : no purpura  [Petechiae] : no petechiae [Skin Induration] : no induration [de-identified] : Well-nourished, NAD [de-identified] : NC/AT, anicteric [de-identified] : FROM throughout, strength 5/5x4, no palpable cords, no muscle atrophy; +b/l straight leg raise [de-identified] : Healing, punctate ulcers of the pretibias b/l, no erythema

## 2022-07-14 VITALS
TEMPERATURE: 98 F | SYSTOLIC BLOOD PRESSURE: 141 MMHG | RESPIRATION RATE: 17 BRPM | OXYGEN SATURATION: 98 % | WEIGHT: 235.01 LBS | HEART RATE: 83 BPM | DIASTOLIC BLOOD PRESSURE: 70 MMHG | HEIGHT: 66 IN

## 2022-07-14 NOTE — H&P ADULT - NSICDXFAMILYHX_GEN_ALL_CORE_FT
FAMILY HISTORY:  Father  Still living? Unknown  Family history of heart failure, Age at diagnosis: Age Unknown  Family history of myocardial infarction, Age at diagnosis: 61-70

## 2022-07-14 NOTE — H&P ADULT - ASSESSMENT
52 y/o male former smoker with PMHx of HTN, HLD, pAfib (s/p DCCV in 2018, on Eliquis, last dose 07/16/2022), pSVT, CAD s/p PCI (most recent laser atherectomy/MARTIN x 2 prox and mid LAD ISR @ [facility] 2/1/19), DM II, and hypothyroidism who in light of risk factors, CCS class II anginal symptoms and recurrent ISR of LAD pt is referred to [facility] for laser atherectomy and brachytherapy.      ASA III, Mallampati II    Hgb/HCT: 13.7/40.1. Pt denies bleeding, melena, BRBPR, hematuria.    cc/hr followed by 75 cc/hr. Pt is euvolemic on exam. EF 65% by cath 1/2019. Cr. 0.84  - Pt has not taken any diabetic meds in 2-3 days. MISS ordered.   , - on Lipitor 80mg     Pt is a candidate for moderate sedation: Yes    Risks & benefits of procedure and alternative therapy have been explained to the patient including but not limited to: allergic reaction, bleeding w/possible need for blood transfusion, infection, renal and vascular compromise, limb damage, arrhythmia, stroke, vessel dissection/perforation, Myocardial infarction, emergent CABG. Informed consent obtained and in chart.

## 2022-07-14 NOTE — H&P ADULT - HISTORY OF PRESENT ILLNESS
COVID:   Cardiologist: Dr. Robert Mendelson   Pharmacy:   Escort:     52 y/o male former smoker with PMHx of HTN, HLD, pAfib (s/p DCCV in 2018, on Eliquis, last dose ____), pSVT, CAD s/p PCI (most recent laser atherectomy/MARTIN x 2 prox and mid LAD ISR @ [facility] 2/1/19), DM II, and hypothyroidism who presented to cardiologist Dr. Mendelson with reports of BLANCHARD with bicycling and walking uphill which did not improve with addition of Ranexa to his regimen. Pt denies fever, chills, cough, palpitations, CP, orthopnea, LE edema, abdominal pain, N/V/D, dizziness, syncope. Per MD note pt underwent nuclear stress test that was ? false positive. Pt was subsequently referred to [facility] for cardiac cath 7/6/22 which revealed LM luminal irregularities, pLAD 60% ISR, mLAD 80% ISR, prox to mid LCx 30%, prox to mid RCA 30%, EDP 15mmHg. In light of pt’s risk factors, CCS class II anginal symptoms and recurrent ISR of LAD pt is referred to [facility] for laser atherectomy and brachytherapy.      Cath history:  Cardiac cath 2/1/19 @ [facility]: MARTIN pLAD 95% ISR s/p laser atherectomy and balloon, MARTIN mLAD 90% ISR s/p laser atherectomy and balloon, LCx 30%, LMCA widely patent.   Cardiac cath @ [facility] (1/30/19): LVEF 65%; pLAD 90% ISR, mLAD 85% at site of prior stent, mLCx 30%, s/p PTCA of p/m LAD.  COVID: on 07/14/2022 at PCP's office   Cardiologist: Dr. Robert Mendelson   Pharmacy: ______________  Escort: ___________    54 y/o male former smoker with PMHx of HTN, HLD, pAfib (s/p DCCV in 2018, on Eliquis, last dose 07/16/2022), pSVT, CAD s/p PCI (most recent laser atherectomy/MARTIN x 2 prox and mid LAD ISR @ [facility] 2/1/19), DM II, and hypothyroidism who presented to cardiologist Dr. Mendelson with reports of BLANCHARD with bicycling and walking uphill which did not improve with addition of Ranexa to his regimen. Pt denies fever, chills, cough, palpitations, CP, orthopnea, LE edema, abdominal pain, N/V/D, dizziness, syncope. Per MD note pt underwent nuclear stress test that was ? false positive. Pt was subsequently referred to [facility] for cardiac cath 7/6/22 which revealed LM luminal irregularities, pLAD 60% ISR, mLAD 80% ISR, prox to mid LCx 30%, prox to mid RCA 30%, EDP 15mmHg. In light of pt’s risk factors, CCS class II anginal symptoms and recurrent ISR of LAD pt is referred to [facility] for laser atherectomy and brachytherapy.      Cath history:  Cardiac cath 2/1/19 @ [facility]: MARTIN pLAD 95% ISR s/p laser atherectomy and balloon, MARTIN mLAD 90% ISR s/p laser atherectomy and balloon, LCx 30%, LMCA widely patent.   Cardiac cath @ [facility] (1/30/19): LVEF 65%; pLAD 90% ISR, mLAD 85% at site of prior stent, mLCx 30%, s/p PTCA of p/m LAD.  COVID: on 07/14/2022 at PCP's office   Cardiologist: Dr. Robert Mendelson   Pharmacy: [facility] [phone]  Escort: None    Meds verified with pt's medication bottles and surescripts. Pt stopped all of his medications several days ago, educated pt that he should not do this.     52 y/o male former smoker with PMHx of HTN, HLD, pAfib (s/p DCCV in 2018, on Eliquis, last dose 07/16/2022), pSVT, CAD s/p PCI (most recent laser atherectomy/MARTIN x 2 prox and mid LAD ISR @ [facility] 2/1/19), DM II, and hypothyroidism who presented to cardiologist Dr. Mendelson with reports of BLANCHARD with bicycling and walking uphill which did not improve with addition of Ranexa to his regimen. Pt denies fever, chills, cough, palpitations, CP, orthopnea, LE edema, abdominal pain, N/V/D, dizziness, syncope. Per MD note pt underwent nuclear stress test that was ? false positive. Pt was subsequently referred to [facility] for cardiac cath 7/6/22 which revealed LM luminal irregularities, pLAD 60% ISR, mLAD 80% ISR, prox to mid LCx 30%, prox to mid RCA 30%, EDP 15mmHg. In light of pt’s risk factors, CCS class II anginal symptoms and recurrent ISR of LAD pt is referred to [facility] for laser atherectomy and brachytherapy.      Cath history:  Cardiac cath 2/1/19 @ [facility]: MARTIN pLAD 95% ISR s/p laser atherectomy and balloon, MARTIN mLAD 90% ISR s/p laser atherectomy and balloon, LCx 30%, LMCA widely patent.   Cardiac cath @ [facility] (1/30/19): LVEF 65%; pLAD 90% ISR, mLAD 85% at site of prior stent, mLCx 30%, s/p PTCA of p/m LAD.  COVID: on 07/14/2022 at PCP's office   Cardiologist: Dr. Robert Mendelson   Pharmacy: [facility] [phone]  Escort: None    Meds verified with pt's medication bottles and surescripts. Pt stopped all of his medications several days ago, educated pt that he should not do this.     54 y/o male former smoker with PMHx of HTN, HLD, pAfib (s/p DCCV in 2018, on Eliquis, last dose 07/16/2022), pSVT, CAD s/p PCI (most recent laser atherectomy/MARTIN x 2 prox and mid LAD ISR @ [facility] 2/1/19), DM II, and hypothyroidism who presented to cardiologist Dr. Mendelson with reports of BLANCHARD with bicycling and walking uphill which did not improve with addition of Ranexa to his regimen. Pt denies fever, chills, cough, palpitations, CP, orthopnea, LE edema, abdominal pain, N/V/D, dizziness, syncope. Per MD note pt underwent nuclear stress test that was ? false positive. Pt was subsequently referred to [facility] for cardiac cath 7/6/22 which revealed LM luminal irregularities, pLAD 60% ISR, mLAD 80% ISR, prox to mid LCx 30%, prox to mid RCA 30%, EDP 15mmHg. In light of pt’s risk factors, CCS class II anginal symptoms and recurrent ISR of LAD pt is referred to [facility] for laser atherectomy and brachytherapy. On 7/19, a PCI with brachytherapy was performed, one MARTIN was placed in the mid LAD. During the procedure, the pt started experiencing 10/10 chest pain which radiated down his R arm. An US was performed and did not demonstrate concerning findings, so concern for stent restenosis and vessel dissection is lower. An EKG was performed, and 1mm ST elevations in leads V1 and V2 were noted. Thus, nitroglycerin gtt 1.5ml/hr was started. Since then, his pain has decreased to a 7/10 and then to 5/10 currently.     Cath history:  Cardiac cath 2/1/19 @ [facility]: MARTIN pLAD 95% ISR s/p laser atherectomy and balloon, MARTIN mLAD 90% ISR s/p laser atherectomy and balloon, LCx 30%, LMCA widely patent.   Cardiac cath @ [facility] 1/30/19: LVEF 65%; pLAD 90% ISR, mLAD 85% at site of prior stent, mLCx 30%, s/p PTCA of p/m LAD.  COVID: on 07/14/2022 at PCP's office   Cardiologist: Dr. Robert Mendelson   Pharmacy: [facility] [phone]  Escort: None    Meds verified with pt's medication bottles and surescripts. Pt stopped all of his medications several days ago, educated pt that he should not do this.     52 y/o male former smoker with PMHx of HTN, HLD, pAfib (s/p DCCV in 2018, on Eliquis, last dose 07/16/2022), pSVT, CAD s/p PCI (most recent laser atherectomy/MARTIN x 2 prox and mid LAD ISR @ [facility] 2/1/19), DM II, and hypothyroidism who presented to cardiologist Dr. Mendelson with reports of BLANCHARD with bicycling and walking uphill which did not improve with addition of Ranexa to his regimen. Pt denies fever, chills, cough, palpitations, CP, orthopnea, LE edema, abdominal pain, N/V/D, dizziness, syncope. Per MD note pt underwent nuclear stress test that was ? false positive. Pt was subsequently referred to [facility] for cardiac cath 7/6/22 which revealed LM luminal irregularities, pLAD 60% ISR, mLAD 80% ISR, prox to mid LCx 30%, prox to mid RCA 30%, EDP 15mmHg. In light of pt’s risk factors, CCS class II anginal symptoms and recurrent ISR of LAD pt is referred to [facility] for laser atherectomy and brachytherapy.   Cath history:  Cardiac cath 2/1/19 @ [facility]: MARTIN pLAD 95% ISR s/p laser atherectomy and balloon, MARTIN mLAD 90% ISR s/p laser atherectomy and balloon, LCx 30%, LMCA widely patent.   Cardiac cath @ [facility] 1/30/19: LVEF 65%; pLAD 90% ISR, mLAD 85% at site of prior stent, mLCx 30%, s/p PTCA of p/m LAD.

## 2022-07-14 NOTE — H&P ADULT - NSHPPHYSICALEXAM_GEN_ALL_CORE
PHYSICAL EXAM:  Constitutional: NAD, laying supine, grimacing in pain  HEENT: NC/AT  Neck: Supple, no JVD  Respiratory: CTA B/L. No w/r/r.   Cardiovascular: RRR, normal S1 and S2, no m/r/g.   Gastrointestinal: +BS, soft NTND, no guarding or rebound tenderness, no palpable masses   Extremities: Extremities warm; no cyanosis, clubbing or edema.   Vascular: Pulses equal and strong throughout.   Neurological: AAOx3, no CN deficits, strength and sensation intact throughout.   Skin: No gross skin abnormalities or rashes

## 2022-07-14 NOTE — H&P ADULT - NSICDXPASTMEDICALHX_GEN_ALL_CORE_FT
PAST MEDICAL HISTORY:  Afib     CAD (coronary artery disease)     DM (diabetes mellitus)     HLD (hyperlipidemia)     HTN (hypertension)     Hypothyroid

## 2022-07-14 NOTE — H&P ADULT - NSHPREVIEWOFSYSTEMS_GEN_ALL_CORE
Patient denies fever, chills, palpitations, SOB, lightheadedness, dizziness, abd pain, leg swelling, rashes, dysuria, and changes in BM. Pt endorses 5/10 central chest pain which radiates down his R arm and nausea.

## 2022-07-14 NOTE — H&P ADULT - NSHPSOCIALHISTORY_GEN_ALL_CORE
Patient drinks one ETOH drink per week. Patient is a former smoker, quit 17 years ago. Patient denies any drug use.

## 2022-07-14 NOTE — H&P ADULT - NSHPLABSRESULTS_GEN_ALL_CORE
ECG: NSE @ 86bpm, Q waves in III, aVF                        13.7   4.68  )-----------( 285      ( 19 Jul 2022 08:08 )             40.1       07-19    136  |  101  |  15  ----------------------------<  373<H>  4.2   |  22  |  0.84    Ca    9.4      19 Jul 2022 08:30    TPro  7.4  /  Alb  4.2  /  TBili  0.2  /  DBili  x   /  AST  18  /  ALT  23  /  AlkPhos  82  07-19      PT/INR - ( 19 Jul 2022 08:08 )   PT: 10.7 sec;   INR: 0.90          PTT - ( 19 Jul 2022 08:08 )  PTT:27.2 sec    CARDIAC MARKERS ( 19 Jul 2022 08:30 )  x     / x     / 97 U/L / x     / 1.9 ng/mL

## 2022-07-14 NOTE — H&P ADULT - NSICDXPASTSURGICALHX_GEN_ALL_CORE_FT
PAST SURGICAL HISTORY:  Amputation of finger, initial encounter Traumatic amputation of thumb in 2014    Mandible fracture s/p surgery    S/P drug eluting coronary stent placement      PAST SURGICAL HISTORY:  Amputation of finger, initial encounter Traumatic amputation of thumb in 2014    Mandible fracture s/p surgery    Previous back surgery     S/P drug eluting coronary stent placement     S/P nasal septoplasty

## 2022-07-19 ENCOUNTER — INPATIENT (INPATIENT)
Facility: HOSPITAL | Age: 57
LOS: 1 days | Discharge: HOME | DRG: 247 | End: 2022-07-21
Attending: STUDENT IN AN ORGANIZED HEALTH CARE EDUCATION/TRAINING PROGRAM | Admitting: STUDENT IN AN ORGANIZED HEALTH CARE EDUCATION/TRAINING PROGRAM
Payer: COMMERCIAL

## 2022-07-19 DIAGNOSIS — Z98.890 OTHER SPECIFIED POSTPROCEDURAL STATES: Chronic | ICD-10-CM

## 2022-07-19 DIAGNOSIS — Z95.5 PRESENCE OF CORONARY ANGIOPLASTY IMPLANT AND GRAFT: Chronic | ICD-10-CM

## 2022-07-19 DIAGNOSIS — S68.119A COMPLETE TRAUMATIC METACARPOPHALANGEAL AMPUTATION OF UNSPECIFIED FINGER, INITIAL ENCOUNTER: Chronic | ICD-10-CM

## 2022-07-19 DIAGNOSIS — S02.609A FRACTURE OF MANDIBLE, UNSPECIFIED, INITIAL ENCOUNTER FOR CLOSED FRACTURE: Chronic | ICD-10-CM

## 2022-07-19 LAB
ALBUMIN SERPL ELPH-MCNC: 4.2 G/DL — SIGNIFICANT CHANGE UP (ref 3.3–5)
ALBUMIN SERPL ELPH-MCNC: 4.3 G/DL — SIGNIFICANT CHANGE UP (ref 3.3–5)
ALP SERPL-CCNC: 72 U/L — SIGNIFICANT CHANGE UP (ref 40–120)
ALP SERPL-CCNC: 82 U/L — SIGNIFICANT CHANGE UP (ref 40–120)
ALT FLD-CCNC: 23 U/L — SIGNIFICANT CHANGE UP (ref 10–45)
ALT FLD-CCNC: 23 U/L — SIGNIFICANT CHANGE UP (ref 10–45)
ANION GAP SERPL CALC-SCNC: 10 MMOL/L — SIGNIFICANT CHANGE UP (ref 5–17)
ANION GAP SERPL CALC-SCNC: 13 MMOL/L — SIGNIFICANT CHANGE UP (ref 5–17)
APTT BLD: 25.2 SEC — LOW (ref 27.5–35.5)
APTT BLD: 27.2 SEC — LOW (ref 27.5–35.5)
APTT BLD: 49.6 SEC — HIGH (ref 27.5–35.5)
AST SERPL-CCNC: 18 U/L — SIGNIFICANT CHANGE UP (ref 10–40)
AST SERPL-CCNC: 22 U/L — SIGNIFICANT CHANGE UP (ref 10–40)
BASOPHILS # BLD AUTO: 0.04 K/UL — SIGNIFICANT CHANGE UP (ref 0–0.2)
BASOPHILS NFR BLD AUTO: 0.9 % — SIGNIFICANT CHANGE UP (ref 0–2)
BILIRUB SERPL-MCNC: 0.2 MG/DL — SIGNIFICANT CHANGE UP (ref 0.2–1.2)
BILIRUB SERPL-MCNC: 0.3 MG/DL — SIGNIFICANT CHANGE UP (ref 0.2–1.2)
BUN SERPL-MCNC: 15 MG/DL — SIGNIFICANT CHANGE UP (ref 7–23)
BUN SERPL-MCNC: 15 MG/DL — SIGNIFICANT CHANGE UP (ref 7–23)
CALCIUM SERPL-MCNC: 8.8 MG/DL — SIGNIFICANT CHANGE UP (ref 8.4–10.5)
CALCIUM SERPL-MCNC: 9.4 MG/DL — SIGNIFICANT CHANGE UP (ref 8.4–10.5)
CHLORIDE SERPL-SCNC: 101 MMOL/L — SIGNIFICANT CHANGE UP (ref 96–108)
CHLORIDE SERPL-SCNC: 104 MMOL/L — SIGNIFICANT CHANGE UP (ref 96–108)
CHOLEST SERPL-MCNC: 174 MG/DL — SIGNIFICANT CHANGE UP
CK MB CFR SERPL CALC: 1.9 NG/ML — SIGNIFICANT CHANGE UP (ref 0–6.7)
CK MB CFR SERPL CALC: 2.9 NG/ML — SIGNIFICANT CHANGE UP (ref 0–6.7)
CK MB CFR SERPL CALC: 23.2 NG/ML — HIGH (ref 0–6.7)
CK SERPL-CCNC: 105 U/L — SIGNIFICANT CHANGE UP (ref 30–200)
CK SERPL-CCNC: 471 U/L — HIGH (ref 30–200)
CK SERPL-CCNC: 97 U/L — SIGNIFICANT CHANGE UP (ref 30–200)
CO2 SERPL-SCNC: 22 MMOL/L — SIGNIFICANT CHANGE UP (ref 22–31)
CO2 SERPL-SCNC: 23 MMOL/L — SIGNIFICANT CHANGE UP (ref 22–31)
CREAT SERPL-MCNC: 0.79 MG/DL — SIGNIFICANT CHANGE UP (ref 0.5–1.3)
CREAT SERPL-MCNC: 0.84 MG/DL — SIGNIFICANT CHANGE UP (ref 0.5–1.3)
EGFR: 101 ML/MIN/1.73M2 — SIGNIFICANT CHANGE UP
EGFR: 103 ML/MIN/1.73M2 — SIGNIFICANT CHANGE UP
EOSINOPHIL # BLD AUTO: 0.15 K/UL — SIGNIFICANT CHANGE UP (ref 0–0.5)
EOSINOPHIL NFR BLD AUTO: 3.2 % — SIGNIFICANT CHANGE UP (ref 0–6)
GLUCOSE BLDC GLUCOMTR-MCNC: 174 MG/DL — HIGH (ref 70–99)
GLUCOSE BLDC GLUCOMTR-MCNC: 246 MG/DL — HIGH (ref 70–99)
GLUCOSE BLDC GLUCOMTR-MCNC: 263 MG/DL — HIGH (ref 70–99)
GLUCOSE BLDC GLUCOMTR-MCNC: 317 MG/DL — HIGH (ref 70–99)
GLUCOSE BLDC GLUCOMTR-MCNC: 344 MG/DL — HIGH (ref 70–99)
GLUCOSE SERPL-MCNC: 290 MG/DL — HIGH (ref 70–99)
GLUCOSE SERPL-MCNC: 373 MG/DL — HIGH (ref 70–99)
HCT VFR BLD CALC: 38.2 % — LOW (ref 39–50)
HCT VFR BLD CALC: 40.1 % — SIGNIFICANT CHANGE UP (ref 39–50)
HDLC SERPL-MCNC: 38 MG/DL — LOW
HGB BLD-MCNC: 13.1 G/DL — SIGNIFICANT CHANGE UP (ref 13–17)
HGB BLD-MCNC: 13.7 G/DL — SIGNIFICANT CHANGE UP (ref 13–17)
IMM GRANULOCYTES NFR BLD AUTO: 0.4 % — SIGNIFICANT CHANGE UP (ref 0–1.5)
INR BLD: 0.9 — SIGNIFICANT CHANGE UP (ref 0.88–1.16)
INR BLD: 0.99 — SIGNIFICANT CHANGE UP (ref 0.88–1.16)
ISTAT ACTK (ACTIVATED CLOTTING TIME KAOLIN): 179 SEC — HIGH (ref 74–137)
ISTAT ACTK (ACTIVATED CLOTTING TIME KAOLIN): 184 SEC — HIGH (ref 74–137)
ISTAT ACTK (ACTIVATED CLOTTING TIME KAOLIN): 190 SEC — HIGH (ref 74–137)
ISTAT ACTK (ACTIVATED CLOTTING TIME KAOLIN): 202 SEC — HIGH (ref 74–137)
ISTAT ACTK (ACTIVATED CLOTTING TIME KAOLIN): 208 SEC — HIGH (ref 74–137)
ISTAT ACTK (ACTIVATED CLOTTING TIME KAOLIN): <50 SEC — LOW (ref 74–137)
LIPID PNL WITH DIRECT LDL SERPL: 104 MG/DL — HIGH
LYMPHOCYTES # BLD AUTO: 1.63 K/UL — SIGNIFICANT CHANGE UP (ref 1–3.3)
LYMPHOCYTES # BLD AUTO: 34.8 % — SIGNIFICANT CHANGE UP (ref 13–44)
MCHC RBC-ENTMCNC: 27.6 PG — SIGNIFICANT CHANGE UP (ref 27–34)
MCHC RBC-ENTMCNC: 27.7 PG — SIGNIFICANT CHANGE UP (ref 27–34)
MCHC RBC-ENTMCNC: 34.2 GM/DL — SIGNIFICANT CHANGE UP (ref 32–36)
MCHC RBC-ENTMCNC: 34.3 GM/DL — SIGNIFICANT CHANGE UP (ref 32–36)
MCV RBC AUTO: 80.4 FL — SIGNIFICANT CHANGE UP (ref 80–100)
MCV RBC AUTO: 81.2 FL — SIGNIFICANT CHANGE UP (ref 80–100)
MONOCYTES # BLD AUTO: 0.47 K/UL — SIGNIFICANT CHANGE UP (ref 0–0.9)
MONOCYTES NFR BLD AUTO: 10 % — SIGNIFICANT CHANGE UP (ref 2–14)
NEUTROPHILS # BLD AUTO: 2.37 K/UL — SIGNIFICANT CHANGE UP (ref 1.8–7.4)
NEUTROPHILS NFR BLD AUTO: 50.7 % — SIGNIFICANT CHANGE UP (ref 43–77)
NON HDL CHOLESTEROL: 136 MG/DL — HIGH
NRBC # BLD: 0 /100 WBCS — SIGNIFICANT CHANGE UP (ref 0–0)
NRBC # BLD: 0 /100 WBCS — SIGNIFICANT CHANGE UP (ref 0–0)
PLATELET # BLD AUTO: 252 K/UL — SIGNIFICANT CHANGE UP (ref 150–400)
PLATELET # BLD AUTO: 285 K/UL — SIGNIFICANT CHANGE UP (ref 150–400)
POTASSIUM SERPL-MCNC: 4.2 MMOL/L — SIGNIFICANT CHANGE UP (ref 3.5–5.3)
POTASSIUM SERPL-MCNC: 4.8 MMOL/L — SIGNIFICANT CHANGE UP (ref 3.5–5.3)
POTASSIUM SERPL-SCNC: 4.2 MMOL/L — SIGNIFICANT CHANGE UP (ref 3.5–5.3)
POTASSIUM SERPL-SCNC: 4.8 MMOL/L — SIGNIFICANT CHANGE UP (ref 3.5–5.3)
PROT SERPL-MCNC: 7.2 G/DL — SIGNIFICANT CHANGE UP (ref 6–8.3)
PROT SERPL-MCNC: 7.4 G/DL — SIGNIFICANT CHANGE UP (ref 6–8.3)
PROTHROM AB SERPL-ACNC: 10.7 SEC — SIGNIFICANT CHANGE UP (ref 10.5–13.4)
PROTHROM AB SERPL-ACNC: 11.8 SEC — SIGNIFICANT CHANGE UP (ref 10.5–13.4)
RBC # BLD: 4.75 M/UL — SIGNIFICANT CHANGE UP (ref 4.2–5.8)
RBC # BLD: 4.94 M/UL — SIGNIFICANT CHANGE UP (ref 4.2–5.8)
RBC # FLD: 13.6 % — SIGNIFICANT CHANGE UP (ref 10.3–14.5)
RBC # FLD: 13.7 % — SIGNIFICANT CHANGE UP (ref 10.3–14.5)
SODIUM SERPL-SCNC: 136 MMOL/L — SIGNIFICANT CHANGE UP (ref 135–145)
SODIUM SERPL-SCNC: 137 MMOL/L — SIGNIFICANT CHANGE UP (ref 135–145)
TRIGL SERPL-MCNC: 158 MG/DL — HIGH
TROPONIN T SERPL-MCNC: 0.02 NG/ML — HIGH (ref 0–0.01)
TROPONIN T SERPL-MCNC: 0.61 NG/ML — CRITICAL HIGH (ref 0–0.01)
WBC # BLD: 4.68 K/UL — SIGNIFICANT CHANGE UP (ref 3.8–10.5)
WBC # BLD: 8.28 K/UL — SIGNIFICANT CHANGE UP (ref 3.8–10.5)
WBC # FLD AUTO: 4.68 K/UL — SIGNIFICANT CHANGE UP (ref 3.8–10.5)
WBC # FLD AUTO: 8.28 K/UL — SIGNIFICANT CHANGE UP (ref 3.8–10.5)

## 2022-07-19 PROCEDURE — 77316 BRACHYTX ISODOSE PLAN SIMPLE: CPT | Mod: 26

## 2022-07-19 PROCEDURE — 92978 ENDOLUMINL IVUS OCT C 1ST: CPT | Mod: 26,LD

## 2022-07-19 PROCEDURE — 77770 HDR RDNCL NTRSTL/ICAV BRCHTX: CPT | Mod: 26

## 2022-07-19 PROCEDURE — 99291 CRITICAL CARE FIRST HOUR: CPT | Mod: GC

## 2022-07-19 PROCEDURE — 77263 THER RADIOLOGY TX PLNG CPLX: CPT

## 2022-07-19 PROCEDURE — 77290 THER RAD SIMULAJ FIELD CPLX: CPT | Mod: 26

## 2022-07-19 PROCEDURE — 99221 1ST HOSP IP/OBS SF/LOW 40: CPT | Mod: 25

## 2022-07-19 PROCEDURE — 93010 ELECTROCARDIOGRAM REPORT: CPT | Mod: 76

## 2022-07-19 PROCEDURE — 92974 CATH PLACE CARDIO BRACHYTX: CPT | Mod: LD

## 2022-07-19 PROCEDURE — 93306 TTE W/DOPPLER COMPLETE: CPT | Mod: 26

## 2022-07-19 PROCEDURE — 92928 PRQ TCAT PLMT NTRAC ST 1 LES: CPT | Mod: LD

## 2022-07-19 PROCEDURE — 77470 SPECIAL RADIATION TREATMENT: CPT | Mod: 26

## 2022-07-19 RX ORDER — NITROGLYCERIN 6.5 MG
0.4 CAPSULE, EXTENDED RELEASE ORAL ONCE
Refills: 0 | Status: COMPLETED | OUTPATIENT
Start: 2022-07-19 | End: 2022-07-19

## 2022-07-19 RX ORDER — INSULIN LISPRO 100/ML
VIAL (ML) SUBCUTANEOUS ONCE
Refills: 0 | Status: COMPLETED | OUTPATIENT
Start: 2022-07-19 | End: 2022-07-19

## 2022-07-19 RX ORDER — METFORMIN HYDROCHLORIDE 850 MG/1
1 TABLET ORAL
Qty: 0 | Refills: 0 | DISCHARGE

## 2022-07-19 RX ORDER — SODIUM CHLORIDE 9 MG/ML
1000 INJECTION, SOLUTION INTRAVENOUS
Refills: 0 | Status: DISCONTINUED | OUTPATIENT
Start: 2022-07-19 | End: 2022-07-21

## 2022-07-19 RX ORDER — ASPIRIN/CALCIUM CARB/MAGNESIUM 324 MG
81 TABLET ORAL DAILY
Refills: 0 | Status: DISCONTINUED | OUTPATIENT
Start: 2022-07-19 | End: 2022-07-21

## 2022-07-19 RX ORDER — LEVOTHYROXINE SODIUM 125 MCG
50 TABLET ORAL DAILY
Refills: 0 | Status: DISCONTINUED | OUTPATIENT
Start: 2022-07-19 | End: 2022-07-21

## 2022-07-19 RX ORDER — LISINOPRIL 2.5 MG/1
40 TABLET ORAL DAILY
Refills: 0 | Status: DISCONTINUED | OUTPATIENT
Start: 2022-07-19 | End: 2022-07-19

## 2022-07-19 RX ORDER — DEXTROSE 50 % IN WATER 50 %
12.5 SYRINGE (ML) INTRAVENOUS ONCE
Refills: 0 | Status: DISCONTINUED | OUTPATIENT
Start: 2022-07-19 | End: 2022-07-21

## 2022-07-19 RX ORDER — DILTIAZEM HCL 120 MG
120 CAPSULE, EXT RELEASE 24 HR ORAL DAILY
Refills: 0 | Status: DISCONTINUED | OUTPATIENT
Start: 2022-07-19 | End: 2022-07-20

## 2022-07-19 RX ORDER — SODIUM CHLORIDE 9 MG/ML
250 INJECTION INTRAMUSCULAR; INTRAVENOUS; SUBCUTANEOUS ONCE
Refills: 0 | Status: COMPLETED | OUTPATIENT
Start: 2022-07-19 | End: 2022-07-19

## 2022-07-19 RX ORDER — CHLORHEXIDINE GLUCONATE 213 G/1000ML
1 SOLUTION TOPICAL ONCE
Refills: 0 | Status: DISCONTINUED | OUTPATIENT
Start: 2022-07-19 | End: 2022-07-21

## 2022-07-19 RX ORDER — INSULIN LISPRO 100/ML
VIAL (ML) SUBCUTANEOUS
Refills: 0 | Status: DISCONTINUED | OUTPATIENT
Start: 2022-07-19 | End: 2022-07-21

## 2022-07-19 RX ORDER — RANOLAZINE 500 MG/1
500 TABLET, FILM COATED, EXTENDED RELEASE ORAL
Refills: 0 | Status: DISCONTINUED | OUTPATIENT
Start: 2022-07-19 | End: 2022-07-21

## 2022-07-19 RX ORDER — CLOPIDOGREL BISULFATE 75 MG/1
300 TABLET, FILM COATED ORAL ONCE
Refills: 0 | Status: COMPLETED | OUTPATIENT
Start: 2022-07-19 | End: 2022-07-19

## 2022-07-19 RX ORDER — ASPIRIN/CALCIUM CARB/MAGNESIUM 324 MG
325 TABLET ORAL ONCE
Refills: 0 | Status: COMPLETED | OUTPATIENT
Start: 2022-07-19 | End: 2022-07-19

## 2022-07-19 RX ORDER — DEXTROSE 50 % IN WATER 50 %
15 SYRINGE (ML) INTRAVENOUS ONCE
Refills: 0 | Status: DISCONTINUED | OUTPATIENT
Start: 2022-07-19 | End: 2022-07-21

## 2022-07-19 RX ORDER — APIXABAN 2.5 MG/1
1 TABLET, FILM COATED ORAL
Qty: 0 | Refills: 0 | DISCHARGE

## 2022-07-19 RX ORDER — PANTOPRAZOLE SODIUM 20 MG/1
1 TABLET, DELAYED RELEASE ORAL
Qty: 0 | Refills: 0 | DISCHARGE

## 2022-07-19 RX ORDER — MORPHINE SULFATE 50 MG/1
12 CAPSULE, EXTENDED RELEASE ORAL ONCE
Refills: 0 | Status: DISCONTINUED | OUTPATIENT
Start: 2022-07-19 | End: 2022-07-19

## 2022-07-19 RX ORDER — CLOPIDOGREL BISULFATE 75 MG/1
75 TABLET, FILM COATED ORAL DAILY
Refills: 0 | Status: DISCONTINUED | OUTPATIENT
Start: 2022-07-20 | End: 2022-07-21

## 2022-07-19 RX ORDER — DEXTROSE 50 % IN WATER 50 %
25 SYRINGE (ML) INTRAVENOUS ONCE
Refills: 0 | Status: DISCONTINUED | OUTPATIENT
Start: 2022-07-19 | End: 2022-07-21

## 2022-07-19 RX ORDER — GLUCAGON INJECTION, SOLUTION 0.5 MG/.1ML
1 INJECTION, SOLUTION SUBCUTANEOUS ONCE
Refills: 0 | Status: DISCONTINUED | OUTPATIENT
Start: 2022-07-19 | End: 2022-07-21

## 2022-07-19 RX ORDER — RANOLAZINE 500 MG/1
1 TABLET, FILM COATED, EXTENDED RELEASE ORAL
Qty: 0 | Refills: 0 | DISCHARGE

## 2022-07-19 RX ORDER — NITROGLYCERIN 6.5 MG
5 CAPSULE, EXTENDED RELEASE ORAL
Qty: 50 | Refills: 0 | Status: DISCONTINUED | OUTPATIENT
Start: 2022-07-19 | End: 2022-07-21

## 2022-07-19 RX ORDER — MOMETASONE FUROATE 50 UG/1
2 SPRAY NASAL
Qty: 0 | Refills: 0 | DISCHARGE

## 2022-07-19 RX ORDER — SOTALOL HCL 120 MG
160 TABLET ORAL EVERY 12 HOURS
Refills: 0 | Status: DISCONTINUED | OUTPATIENT
Start: 2022-07-19 | End: 2022-07-20

## 2022-07-19 RX ORDER — ATORVASTATIN CALCIUM 80 MG/1
80 TABLET, FILM COATED ORAL AT BEDTIME
Refills: 0 | Status: DISCONTINUED | OUTPATIENT
Start: 2022-07-19 | End: 2022-07-21

## 2022-07-19 RX ORDER — SODIUM CHLORIDE 9 MG/ML
500 INJECTION INTRAMUSCULAR; INTRAVENOUS; SUBCUTANEOUS
Refills: 0 | Status: DISCONTINUED | OUTPATIENT
Start: 2022-07-19 | End: 2022-07-19

## 2022-07-19 RX ORDER — ZOLPIDEM TARTRATE 10 MG/1
1 TABLET ORAL
Qty: 0 | Refills: 0 | DISCHARGE

## 2022-07-19 RX ORDER — SODIUM CHLORIDE 9 MG/ML
1000 INJECTION INTRAMUSCULAR; INTRAVENOUS; SUBCUTANEOUS
Refills: 0 | Status: DISCONTINUED | OUTPATIENT
Start: 2022-07-19 | End: 2022-07-19

## 2022-07-19 RX ORDER — ISOSORBIDE MONONITRATE 60 MG/1
1 TABLET, EXTENDED RELEASE ORAL
Qty: 0 | Refills: 0 | DISCHARGE

## 2022-07-19 RX ORDER — INSULIN LISPRO 100/ML
12 VIAL (ML) SUBCUTANEOUS
Refills: 0 | Status: DISCONTINUED | OUTPATIENT
Start: 2022-07-19 | End: 2022-07-21

## 2022-07-19 RX ORDER — INSULIN GLARGINE 100 [IU]/ML
32 INJECTION, SOLUTION SUBCUTANEOUS AT BEDTIME
Refills: 0 | Status: DISCONTINUED | OUTPATIENT
Start: 2022-07-19 | End: 2022-07-20

## 2022-07-19 RX ORDER — LEVOTHYROXINE SODIUM 125 MCG
1 TABLET ORAL
Qty: 0 | Refills: 0 | DISCHARGE

## 2022-07-19 RX ORDER — MORPHINE SULFATE 50 MG/1
2 CAPSULE, EXTENDED RELEASE ORAL ONCE
Refills: 0 | Status: DISCONTINUED | OUTPATIENT
Start: 2022-07-19 | End: 2022-07-19

## 2022-07-19 RX ORDER — LANOLIN ALCOHOL/MO/W.PET/CERES
5 CREAM (GRAM) TOPICAL AT BEDTIME
Refills: 0 | Status: DISCONTINUED | OUTPATIENT
Start: 2022-07-19 | End: 2022-07-21

## 2022-07-19 RX ORDER — APIXABAN 2.5 MG/1
5 TABLET, FILM COATED ORAL EVERY 12 HOURS
Refills: 0 | Status: DISCONTINUED | OUTPATIENT
Start: 2022-07-20 | End: 2022-07-21

## 2022-07-19 RX ADMIN — CLOPIDOGREL BISULFATE 300 MILLIGRAM(S): 75 TABLET, FILM COATED ORAL at 09:27

## 2022-07-19 RX ADMIN — Medication 12 UNIT(S): at 16:53

## 2022-07-19 RX ADMIN — Medication 325 MILLIGRAM(S): at 09:29

## 2022-07-19 RX ADMIN — INSULIN GLARGINE 32 UNIT(S): 100 INJECTION, SOLUTION SUBCUTANEOUS at 21:05

## 2022-07-19 RX ADMIN — MORPHINE SULFATE 2 MILLIGRAM(S): 50 CAPSULE, EXTENDED RELEASE ORAL at 13:34

## 2022-07-19 RX ADMIN — SODIUM CHLORIDE 200 MILLILITER(S): 9 INJECTION INTRAMUSCULAR; INTRAVENOUS; SUBCUTANEOUS at 13:42

## 2022-07-19 RX ADMIN — Medication 8: at 09:27

## 2022-07-19 RX ADMIN — ATORVASTATIN CALCIUM 80 MILLIGRAM(S): 80 TABLET, FILM COATED ORAL at 21:05

## 2022-07-19 RX ADMIN — Medication 6: at 16:53

## 2022-07-19 RX ADMIN — Medication 2: at 21:04

## 2022-07-19 RX ADMIN — RANOLAZINE 500 MILLIGRAM(S): 500 TABLET, FILM COATED, EXTENDED RELEASE ORAL at 21:05

## 2022-07-19 RX ADMIN — SODIUM CHLORIDE 75 MILLILITER(S): 9 INJECTION INTRAMUSCULAR; INTRAVENOUS; SUBCUTANEOUS at 09:28

## 2022-07-19 RX ADMIN — Medication 50 MICROGRAM(S): at 17:22

## 2022-07-19 RX ADMIN — Medication 1.5 MICROGRAM(S)/MIN: at 14:30

## 2022-07-19 RX ADMIN — Medication 160 MILLIGRAM(S): at 18:58

## 2022-07-19 RX ADMIN — Medication 0.4 MILLIGRAM(S): at 13:12

## 2022-07-19 RX ADMIN — Medication 5 MILLIGRAM(S): at 22:33

## 2022-07-19 RX ADMIN — SODIUM CHLORIDE 125 MILLILITER(S): 9 INJECTION INTRAMUSCULAR; INTRAVENOUS; SUBCUTANEOUS at 09:28

## 2022-07-19 RX ADMIN — Medication 81 MILLIGRAM(S): at 17:23

## 2022-07-19 RX ADMIN — Medication 120 MILLIGRAM(S): at 18:00

## 2022-07-19 NOTE — PROGRESS NOTE ADULT - ASSESSMENT
54 y/o male former smoker with PMHx of HTN, HLD, pAfib (s/p DCCV in 2018, on Eliquis, last dose 07/16/2022), pSVT, CAD s/p PCI (most recent laser atherectomy/MARTIN x 2 prox and mid LAD ISR @ [facility] 2/1/19), DM II, and hypothyroidism admitted to CCU for post-cath monitoring i/s/o chest pain.     An US was performed and did not demonstrate concerning findings, so concern for stent restenosis and vessel dissection is lower. An EKG was performed, and 1mm ST elevations in leads V1 and V2 were noted. Thus, nitroglycerin gtt 1.5ml/hr was started. Since then, his pain has decreased to a 7/10 and then to 5/10 currently.     Plan:  Neuro:  - Pt mentating well, A+Ox3  - No active issues    Cardio:  #r/o myocardial ischemia s/p PCI w/ MARTIN insertion  - Pt developed 10/10 chest pain radiating down R arm associated with nausea during PCI procedure  - Initial EKGs demonstrated 1mm ST elevations in leads V1 and V2  - Initial CMP, CMKB and CK WNL, however initial Troponin elevated at 0.2     - EKG Q1hour  - Trend Troponin and CKMB to peak  - c/w ASA 81 Qd, Nitroglycerin gtt, and Ranolazine 500 BID  - c/w Sotalol 160 BID and Diltiazem 120 Qd    #Afib  - Pt with hx afib on eliquis outpt, s/p DCCV 2018    - Restart Eliquis tomorrow AM    #HTN   Home meds: Ramipril 10 Qd    - Inpt Lisonopril 40 Qd ordered, hold until AM    #HLD  Home meds: Atorvastatin 80 Qd    -c/w Atorvastatin 80 Qd      Pulmonary:   - No active issues    GI:  - No active issues    Renal/:  - No active issues    Endo:  #Diabetes Mellitus:  - c/w Lantus 32U Qd, Lispro 12U TID, and ISS  - Monitor FS glucose and adjust regimen accordingly    #Hypothyroidism  - c/w Levothyroxine 50mcg Qd  - Monitor clinically    ID:  - No active issues    Heme/Onc:  - No active issues    Dispo  F: None  E: Replete as needed, K>4 and Mg>2  N: DASH/TLC diet  GI PPX: None  DVT PPX: Eliquis held until 7/20 AM  DIspo: CCU  Code:   52 y/o male former smoker with PMHx of HTN, HLD, pAfib (s/p DCCV in 2018, on Eliquis, last dose 07/16/2022), pSVT, CAD s/p PCI (most recent laser atherectomy/MARTIN x 2 prox and mid LAD ISR @ [facility] 2/1/19), DM II, and hypothyroidism admitted to CCU for post-cath monitoring i/s/o chest pain.     An US was performed and did not demonstrate concerning findings, so concern for stent restenosis and vessel dissection is lower. An EKG was performed, and 1mm ST elevations in leads V1 and V2 were noted. Thus, nitroglycerin gtt 1.5ml/hr was started. Since then, his pain has decreased to a 7/10 and then to 5/10 currently.     Plan:  Neuro:  - Pt mentating well, A+Ox3  - No active issues    Cardio:  #r/o myocardial ischemia s/p PCI w/ MARTIN insertion  - During PCI, mid LAD MARTIN was placed. Pt developed 10/10 chest pain radiating down R arm associated with nausea during PCI procedure. US was performed and did not demonstrate concerning findings. Initial EKG demonstrated 1mm ST elevations in leads V1 and V2. Nitroglycerin gtt 1.5ml/hr was started. Since then, his pain has decreased to 3/10 and pain radiation to R arm has subsided.   - Initial CMP, CMKB and CK WNL, however initial Troponin elevated at 0.2     - EKG Q1hour  - Trend Troponin and CKMB to peak  - c/w ASA 81 Qd, Nitroglycerin gtt, and Ranolazine 500 BID  - c/w Sotalol 160 BID and Diltiazem 120 Qd    #Afib  - Pt on eliquis outpt, s/p DCCV 2018    - Restart Eliquis tomorrow AM    #HTN   Home meds: Ramipril 10 Qd    - Inpt Lisonopril 40 Qd ordered, hold until AM    #HLD  Home meds: Atorvastatin 80 Qd    -c/w Atorvastatin 80 Qd      Pulmonary:   - No active issues    GI:  - No active issues    Renal/:  - No active issues    Endo:  #Diabetes Mellitus:  - c/w Lantus 32U Qd, Lispro 12U TID, and ISS  - Monitor FS glucose and adjust regimen accordingly    #Hypothyroidism  - c/w Levothyroxine 50mcg Qd  - Monitor clinically    ID:  - No active issues    Heme/Onc:  - No active issues    Dispo  F: None  E: Replete as needed, K>4 and Mg>2  N: DASH/TLC diet  GI PPX: None  DVT PPX: Eliquis held until 7/20 AM  DIspo: CCU  Code:

## 2022-07-19 NOTE — CHART NOTE - NSCHARTNOTEFT_GEN_A_CORE
Notified by RN of post-procedure CP. On bedside eval, pt reports 7/10 CP "tightness, squeezing" w/ radiation down R arm. Pt states that this pain has been consistent and unchanged since leaving the room w/ no alleviating or exacerbating factors. EKG reveals new ZAC in V1 and V2, w/ sub-1mm STD in inferior leads. VSS, /83, HR 88 regular, RR 24 non-labored, SpO2 97%+ on RA. Pt given NTG 0.3mg SL x1 and Morphine 2mg IVP x1 w/o improvement in symptoms. Repeat EKG's x4 unchanged from prior. IC Fellow Dr. Naik and Dr. Ivan aware. Bedside echo performed w/ results pending. Per d/w Dr. Ivan, pt started on NTG ggt and with plan to uptitrate as tolerated and will continue serial EKGs. Will continue to monitor.

## 2022-07-19 NOTE — PATIENT PROFILE ADULT - FALL HARM RISK - HARM RISK INTERVENTIONS
Assistance with ambulation/Assistance OOB with selected safe patient handling equipment/Communicate Risk of Fall with Harm to all staff/Discuss with provider need for PT consult/Monitor gait and stability/Provide patient with walking aids - walker, cane, crutches/Reinforce activity limits and safety measures with patient and family/Sit up slowly, dangle for a short time, stand at bedside before walking/Tailored Fall Risk Interventions/Use of alarms - bed, chair and/or voice tab/Visual Cue: Yellow wristband and red socks/Bed in lowest position, wheels locked, appropriate side rails in place/Call bell, personal items and telephone in reach/Instruct patient to call for assistance before getting out of bed or chair/Non-slip footwear when patient is out of bed/[place] to call system/Physically safe environment - no spills, clutter or unnecessary equipment/Purposeful Proactive Rounding/Room/bathroom lighting operational, light cord in reach

## 2022-07-19 NOTE — PROGRESS NOTE ADULT - ASSESSMENT
54 y/o male former smoker with PMHx of HTN, HLD, pAfib (s/p DCCV in 2018, on Eliquis, last dose 07/16/2022), pSVT, CAD s/p PCI (most recent laser atherectomy/MARTIN x 2 prox and mid LAD ISR @ [facility] 2/1/19), DM II, and hypothyroidism admitted to CCU for post-cath monitoring i/s/o chest pain.     An US was performed and did not demonstrate concerning findings, so concern for stent restenosis and vessel dissection is lower. An EKG was performed, and 1mm ST elevations in leads V1 and V2 were noted. Thus, nitroglycerin gtt 1.5ml/hr was started. Since then, his pain has decreased to a 7/10 and then to 5/10 currently.     Plan:  Neuro:  - Pt mentating well, A+Ox3  - No active issues    Cardio:  #r/o myocardial ischemia s/p PCI w/ MARTIN insertion  - During PCI, mid LAD MARTIN was placed. Pt developed 10/10 chest pain radiating down R arm associated with nausea during PCI procedure. US was performed and did not demonstrate concerning findings. Initial EKG demonstrated 1mm ST elevations in leads V1 and V2. Nitroglycerin gtt 1.5ml/hr was started. Since then, his pain has decreased to 3/10 and pain radiation to R arm has subsided.   - Initial CMP, CMKB and CK WNL, however initial Troponin elevated at 0.2     - EKG Q1hour  - Trend Troponin and CKMB to peak  - c/w ASA 81 Qd, Nitroglycerin gtt, and Ranolazine 500 BID  - c/w Sotalol 160 BID and Diltiazem 120 Qd    #Afib  - Pt on eliquis outpt, s/p DCCV 2018    - Restart Eliquis tomorrow AM    #HTN   Home meds: Ramipril 10 Qd    - Inpt Lisonopril 40 Qd ordered, hold until AM    #HLD  Home meds: Atorvastatin 80 Qd    -c/w Atorvastatin 80 Qd      Pulmonary:   - No active issues    GI:  - No active issues    Renal/:  - No active issues    Endo:  #Diabetes Mellitus:  - c/w Lantus 32U Qd, Lispro 12U TID, and ISS  - Monitor FS glucose and adjust regimen accordingly    #Hypothyroidism  - c/w Levothyroxine 50mcg Qd  - Monitor clinically    ID:  - No active issues    Heme/Onc:  - No active issues    Dispo  F: None  E: Replete as needed, K>4 and Mg>2  N: DASH/TLC diet  GI PPX: None  DVT PPX: Eliquis held until 7/20 AM  DIspo: CCU  Code:

## 2022-07-19 NOTE — PROGRESS NOTE ADULT - ATTENDING COMMENTS
56/M with h/p CAD s/p PCI (last with atherectomy in 2019), Known reduced EF 40%, A>fib s/p 2 ablation (5 years back) on AC and sotalol, obesity, DM A1c 10, presented for elective LHC and LAD intervention after noted to have positive NST. s/p Brachytherapy and laser atherectomy, PCI of LAD, with post procedural chest pain and ST elevation V1 to V3. Pt post procedure admitted to CCU and started on nitro drop.   Pt reports his symptoms are better. Nitro drip was weaned this AM. EKG changes resolved. rising cardiac markers in the setting if recent intervention.       PE: JVP ~6cmsclear BS  s1s2+ RRR no murmur/gallop  no LE oedema    Plan:  CAD s/p PCI, atherectomy, Brachytherapy to LAD,   post procedure chest pain with CKG changes  Cont DAPT and statin  wean nitro drip  start imdur 30mg po daily  cont ranexa  will add metoprolol 25mg po BID  trend trop to peak    HFmEF ef 45%  will add metoprolol as above  hold cardizem and sotalol

## 2022-07-20 DIAGNOSIS — E11.9 TYPE 2 DIABETES MELLITUS WITHOUT COMPLICATIONS: ICD-10-CM

## 2022-07-20 LAB
A1C WITH ESTIMATED AVERAGE GLUCOSE RESULT: 10.7 % — HIGH (ref 4–5.6)
CK MB CFR SERPL CALC: 36.6 NG/ML — HIGH (ref 0–6.7)
CK MB CFR SERPL CALC: 47.4 NG/ML — HIGH (ref 0–6.7)
CK MB CFR SERPL CALC: 47.8 NG/ML — HIGH (ref 0–6.7)
CK MB CFR SERPL CALC: 53.1 NG/ML — HIGH (ref 0–6.7)
CK SERPL-CCNC: 610 U/L — HIGH (ref 30–200)
CK SERPL-CCNC: 701 U/L — HIGH (ref 30–200)
CK SERPL-CCNC: 715 U/L — HIGH (ref 30–200)
CK SERPL-CCNC: 777 U/L — HIGH (ref 30–200)
ESTIMATED AVERAGE GLUCOSE: 260 MG/DL — HIGH (ref 68–114)
GLUCOSE BLDC GLUCOMTR-MCNC: 136 MG/DL — HIGH (ref 70–99)
GLUCOSE BLDC GLUCOMTR-MCNC: 211 MG/DL — HIGH (ref 70–99)
GLUCOSE BLDC GLUCOMTR-MCNC: 218 MG/DL — HIGH (ref 70–99)
GLUCOSE BLDC GLUCOMTR-MCNC: 241 MG/DL — HIGH (ref 70–99)
HCT VFR BLD CALC: 36.6 % — LOW (ref 39–50)
HGB BLD-MCNC: 12.3 G/DL — LOW (ref 13–17)
MCHC RBC-ENTMCNC: 27.5 PG — SIGNIFICANT CHANGE UP (ref 27–34)
MCHC RBC-ENTMCNC: 33.6 GM/DL — SIGNIFICANT CHANGE UP (ref 32–36)
MCV RBC AUTO: 81.7 FL — SIGNIFICANT CHANGE UP (ref 80–100)
NRBC # BLD: 0 /100 WBCS — SIGNIFICANT CHANGE UP (ref 0–0)
PLATELET # BLD AUTO: 260 K/UL — SIGNIFICANT CHANGE UP (ref 150–400)
RBC # BLD: 4.48 M/UL — SIGNIFICANT CHANGE UP (ref 4.2–5.8)
RBC # FLD: 13.8 % — SIGNIFICANT CHANGE UP (ref 10.3–14.5)
TROPONIN T SERPL-MCNC: 0.89 NG/ML — CRITICAL HIGH (ref 0–0.01)
TROPONIN T SERPL-MCNC: 0.93 NG/ML — CRITICAL HIGH (ref 0–0.01)
TROPONIN T SERPL-MCNC: 1.1 NG/ML — CRITICAL HIGH (ref 0–0.01)
TROPONIN T SERPL-MCNC: 1.2 NG/ML — CRITICAL HIGH (ref 0–0.01)
WBC # BLD: 8.84 K/UL — SIGNIFICANT CHANGE UP (ref 3.8–10.5)
WBC # FLD AUTO: 8.84 K/UL — SIGNIFICANT CHANGE UP (ref 3.8–10.5)

## 2022-07-20 PROCEDURE — 93308 TTE F-UP OR LMTD: CPT | Mod: 26

## 2022-07-20 PROCEDURE — 71045 X-RAY EXAM CHEST 1 VIEW: CPT | Mod: 26

## 2022-07-20 PROCEDURE — 99291 CRITICAL CARE FIRST HOUR: CPT

## 2022-07-20 RX ORDER — ISOSORBIDE MONONITRATE 60 MG/1
30 TABLET, EXTENDED RELEASE ORAL DAILY
Refills: 0 | Status: DISCONTINUED | OUTPATIENT
Start: 2022-07-20 | End: 2022-07-21

## 2022-07-20 RX ORDER — SOTALOL HCL 120 MG
160 TABLET ORAL EVERY 12 HOURS
Refills: 0 | Status: DISCONTINUED | OUTPATIENT
Start: 2022-07-20 | End: 2022-07-20

## 2022-07-20 RX ORDER — INSULIN GLARGINE 100 [IU]/ML
38 INJECTION, SOLUTION SUBCUTANEOUS AT BEDTIME
Refills: 0 | Status: DISCONTINUED | OUTPATIENT
Start: 2022-07-20 | End: 2022-07-21

## 2022-07-20 RX ORDER — METOPROLOL TARTRATE 50 MG
25 TABLET ORAL
Refills: 0 | Status: DISCONTINUED | OUTPATIENT
Start: 2022-07-20 | End: 2022-07-21

## 2022-07-20 RX ADMIN — RANOLAZINE 500 MILLIGRAM(S): 500 TABLET, FILM COATED, EXTENDED RELEASE ORAL at 08:44

## 2022-07-20 RX ADMIN — Medication 5 MILLIGRAM(S): at 21:13

## 2022-07-20 RX ADMIN — Medication 1.5 MICROGRAM(S)/MIN: at 08:42

## 2022-07-20 RX ADMIN — Medication 12 UNIT(S): at 11:46

## 2022-07-20 RX ADMIN — Medication 50 MICROGRAM(S): at 05:41

## 2022-07-20 RX ADMIN — Medication 4: at 21:12

## 2022-07-20 RX ADMIN — Medication 81 MILLIGRAM(S): at 11:47

## 2022-07-20 RX ADMIN — Medication 160 MILLIGRAM(S): at 09:06

## 2022-07-20 RX ADMIN — CLOPIDOGREL BISULFATE 75 MILLIGRAM(S): 75 TABLET, FILM COATED ORAL at 11:47

## 2022-07-20 RX ADMIN — APIXABAN 5 MILLIGRAM(S): 2.5 TABLET, FILM COATED ORAL at 17:43

## 2022-07-20 RX ADMIN — APIXABAN 5 MILLIGRAM(S): 2.5 TABLET, FILM COATED ORAL at 05:41

## 2022-07-20 RX ADMIN — Medication 4: at 07:25

## 2022-07-20 RX ADMIN — ISOSORBIDE MONONITRATE 30 MILLIGRAM(S): 60 TABLET, EXTENDED RELEASE ORAL at 12:25

## 2022-07-20 RX ADMIN — Medication 25 MILLIGRAM(S): at 17:21

## 2022-07-20 RX ADMIN — Medication 12 UNIT(S): at 17:20

## 2022-07-20 RX ADMIN — Medication 100 MILLIGRAM(S): at 22:52

## 2022-07-20 RX ADMIN — RANOLAZINE 500 MILLIGRAM(S): 500 TABLET, FILM COATED, EXTENDED RELEASE ORAL at 17:21

## 2022-07-20 RX ADMIN — INSULIN GLARGINE 38 UNIT(S): 100 INJECTION, SOLUTION SUBCUTANEOUS at 21:14

## 2022-07-20 RX ADMIN — ATORVASTATIN CALCIUM 80 MILLIGRAM(S): 80 TABLET, FILM COATED ORAL at 21:13

## 2022-07-20 RX ADMIN — Medication 4: at 11:46

## 2022-07-20 NOTE — CONSULT NOTE ADULT - NS ATTEND AMEND GEN_ALL_CORE FT
This patient with Diabetes mellitus with Hgb A1C  10.7%, is S/P PCI on Eliquis for atrial fibrillation. He is S/P laser atherectomy. Glucose levels were  263-174 last night and today 241-245. I agree with treating him with 32 units Lantus Insulin and 11 units pre-meal Insulin.

## 2022-07-20 NOTE — PROGRESS NOTE ADULT - ATTENDING COMMENTS
56/M with h/p CAD s/p PCI (last with atherectomy in 2019), Known reduced EF 40%, A>fib s/p 2 ablation (5 years back) on AC and sotalol, obesity, DM A1c 10, presented for elective LHC and LAD intervention after noted to have positive NST. s/p Brachytherapy and laser atherectomy, PCI of LAD, with post procedural chest pain and ST elevation V1 to V3. Pt post procedure admitted to CCU and started on nitro drop.   Pt reports his symptoms are better. Nitro drip was weaned this AM. EKG changes resolved. rising cardiac markers in the setting if recent intervention.     ICU Vital Signs Last 24 Hrs  T(F): 98.3 (20 Jul 2022 12:00), Max: 98.5 (20 Jul 2022 08:59)  HR: 65 (20 Jul 2022 13:00) (60 - 86)  BP: 98/54 (20 Jul 2022 13:00) (98/54 - 154/82)  BP(mean): 74 (20 Jul 2022 13:00) (74 - 112)  RR: 19 (20 Jul 2022 13:00) (16 - 26)  SpO2: 97% (20 Jul 2022 13:00) (93% - 98%)    PE: JVP ~6cmsclear BS  s1s2+ RRR no murmur/gallop  no LE oedema    Labs:                        12.3   8.84  )-----------( 260      ( 20 Jul 2022 04:39 )             36.6    07-19    137  |  104  |  15  ----------------------------<  290<H>  4.8   |  23  |  0.79    Ca    8.8      19 Jul 2022 15:36    TPro  7.2  /  Alb  4.3  /  TBili  0.3  /  DBili  x   /  AST  22  /  ALT  23  /  AlkPhos  72  07-19    Troponin: 0.02--> 1.20, CK: 105--> 777    Plan:  CAD s/p PCI, atherectomy, Brachytherapy to LAD,   post procedure chest pain with CKG changes  Cont DAPT and statin  wean nitro drip  start imdur 30mg po daily  cont ranexa  will add metoprolol 25mg po BID  trend trop to peak    HFmEF ef 45%  will add metoprolol as above  hold cardizem and sotalol  Will resume ACEI--> lisinopril tomorrow  repeat limited TTE to assess LV function    DM - uncontrolled  endo consult    OOB

## 2022-07-20 NOTE — PROVIDER CONTACT NOTE (CRITICAL VALUE NOTIFICATION) - NS PROVIDER READ BACK
16 ml injected throughout the case. 84 mL total wasted during the case. 100 mL total used in the case. 
yes

## 2022-07-20 NOTE — CONSULT NOTE ADULT - ASSESSMENT
54 y/o male former smoker with PMHx of HTN, HLD, pAfib (s/p DCCV in 2018, on Eliquis, last dose 07/16/2022), pSVT, CAD s/p PCI (most recent laser atherectomy/MARTIN x 2 prox and mid LAD ISR @ [facility] 2/1/19), DM II, and hypothyroidism admitted to CCU for post-cath monitoring i/s/o chest pain.        DM - type 2 uncontrolled, complicated with hyperglycemia.  A1C: 10.  Weight: 106, BMI 38  Cr/GRF: NL  EF: 45-50%

## 2022-07-20 NOTE — PROGRESS NOTE ADULT - SUBJECTIVE AND OBJECTIVE BOX
HPI: 54 y/o male former smoker with PMHx of HTN, HLD, pAfib (s/p DCCV in 2018, on Eliquis, last dose 07/16/2022), pSVT, CAD s/p PCI (most recent laser atherectomy/MARTIN x 2 prox and mid LAD ISR @ [facility] 2/1/19), DM II, and hypothyroidism who presented to cardiologist Dr. Mendelson with reports of BLANCHARD with bicycling and walking uphill which did not improve with addition of Ranexa to his regimen. Pt denies fever, chills, cough, palpitations, CP, orthopnea, LE edema, abdominal pain, N/V/D, dizziness, syncope. Per MD note pt underwent nuclear stress test that was ? false positive. Pt was subsequently referred to [facility] for cardiac cath 7/6/22 which revealed LM luminal irregularities, pLAD 60% ISR, mLAD 80% ISR, prox to mid LCx 30%, prox to mid RCA 30%, EDP 15mmHg. In light of pt’s risk factors, CCS class II anginal symptoms and recurrent ISR of LAD pt is referred to [facility] for laser atherectomy and brachytherapy.     On 7/19, a PCI with brachytherapy was performed, one MARTIN was placed in the mid LAD. During the procedure, the pt stating he started experiencing 10/10 chest pain which radiated down his R arm.     INTERIM EVENTS: Pt states the pain has improved to a 3/10 and no longer radiates down his arm; pt still endorses nausea.     SUBJECTIVE:  Patient seen and examined at bedside.  ROS  Patient denies fever, chills, palpitations, SOB, lightheadedness, dizziness, abd pain, leg swelling, rashes, dysuria, and changes in BM. Pt endorses 5/10 central chest pain which radiates down his R arm and nausea.    Vital Signs Last 12 Hrs  T(F): 98.3 (07-19-22 @ 16:15), Max: 98.3 (07-19-22 @ 16:15)  HR: 85 (07-19-22 @ 17:04) (80 - 86)  BP: 154/82 (07-19-22 @ 17:04) (134/81 - 154/82)  BP(mean): 112 (07-19-22 @ 17:04) (98 - 112)  RR: 17 (07-19-22 @ 17:04) (17 - 19)  SpO2: 98% (07-19-22 @ 17:04) (96% - 98%)  I&O's Summary    19 Jul 2022 07:01  -  19 Jul 2022 17:34  --------------------------------------------------------  IN: 18 mL / OUT: 425 mL / NET: -407 mL        PHYSICAL EXAM:  Constitutional: NAD, comfortable in bed.  HEENT: NC/AT, PERRLA, EOMI, no conjunctival pallor or scleral icterus, MMM  Neck: Supple, no JVD  Respiratory: CTA B/L. No w/r/r.   Cardiovascular: RRR, normal S1 and S2, no m/r/g.   Gastrointestinal: +BS, soft NTND, no guarding or rebound tenderness, no palpable masses   Extremities: wwp; no cyanosis, clubbing or edema.   Vascular: Pulses equal and strong throughout.   Neurological: AAOx3, no CN deficits, strength and sensation intact throughout.   Skin: No gross skin abnormalities or rashes        LABS:                        13.1   8.28  )-----------( 252      ( 19 Jul 2022 15:36 )             38.2     07-19    137  |  104  |  15  ----------------------------<  290<H>  4.8   |  23  |  0.79    Ca    8.8      19 Jul 2022 15:36    TPro  7.2  /  Alb  4.3  /  TBili  0.3  /  DBili  x   /  AST  22  /  ALT  23  /  AlkPhos  72  07-19    PT/INR - ( 19 Jul 2022 15:36 )   PT: 11.8 sec;   INR: 0.99          PTT - ( 19 Jul 2022 15:36 )  PTT:49.6 sec        RADIOLOGY & ADDITIONAL TESTS:    MEDICATIONS  (STANDING):  aspirin enteric coated 81 milliGRAM(s) Oral daily  atorvastatin 80 milliGRAM(s) Oral at bedtime  chlorhexidine 4% Liquid 1 Application(s) Topical Once  dextrose 5%. 1000 milliLiter(s) (100 mL/Hr) IV Continuous <Continuous>  dextrose 5%. 1000 milliLiter(s) (50 mL/Hr) IV Continuous <Continuous>  dextrose 50% Injectable 25 Gram(s) IV Push Once  dextrose 50% Injectable 12.5 Gram(s) IV Push Once  dextrose 50% Injectable 25 Gram(s) IV Push Once  diltiazem    milliGRAM(s) Oral daily  glucagon  Injectable 1 milliGRAM(s) IntraMuscular Once  insulin glargine Injectable (LANTUS) 32 Unit(s) SubCutaneous at bedtime  insulin lispro (ADMELOG) corrective regimen sliding scale   SubCutaneous Before meals and at bedtime  insulin lispro Injectable (ADMELOG) 12 Unit(s) SubCutaneous three times a day before meals  levothyroxine 50 MICROGram(s) Oral daily  nitroglycerin  Infusion 5 MICROgram(s)/Min (1.5 mL/Hr) IV Continuous <Continuous>  ranolazine 500 milliGRAM(s) Oral two times a day  sotalol 160 milliGRAM(s) Oral every 12 hours    MEDICATIONS  (PRN):  dextrose Oral Gel 15 Gram(s) Oral Once PRN Blood Glucose LESS THAN 70 milliGRAM(s)/deciliter
OVERNIGHT EVENTS: Trops, CK, CKMB steadily charla o/n. Pt clinically improved o/n however, stating his CP was resolving. Nitro ggt was increased up to 120 gradually o/n. Morning Sotalol was held.     SUBJECTIVE:  Patient seen and examined at bedside.  Patient denies h/n/v/d, fever, chills, palpitations, sob, abd pain, leg swelling, rashes, dysuria, and changes in BM. Pt states his CP is "gone" but he is feeling "chest tightness" centrally in his chest. Pt denies pain radiation down his R arm.     Vital Signs Last 12 Hrs  T(F): 98.5 (07-20-22 @ 08:59), Max: 98.5 (07-20-22 @ 08:59)  HR: 66 (07-20-22 @ 10:00) (60 - 75)  BP: 115/61 (07-20-22 @ 10:00) (102/57 - 118/65)  BP(mean): 82 (07-20-22 @ 10:00) (74 - 89)  RR: 22 (07-20-22 @ 10:00) (17 - 26)  SpO2: 93% (07-20-22 @ 10:00) (93% - 97%)  I&O's Summary    19 Jul 2022 07:01  -  20 Jul 2022 07:00  --------------------------------------------------------  IN: 312 mL / OUT: 925 mL / NET: -613 mL    20 Jul 2022 07:01  -  20 Jul 2022 11:03  --------------------------------------------------------  IN: 90 mL / OUT: 0 mL / NET: 90 mL        PHYSICAL EXAM:  Constitutional: NAD, lying supine in bed.  HEENT: NC/AT, PERRLA, EOMI, no conjunctival pallor or scleral icterus, MMM  Neck: Supple, no JVD  Respiratory: CTA B/L. No w/r/r.   Cardiovascular: RRR, normal S1 and S2, no m/r/g.   Gastrointestinal: +BS, soft NTND, no guarding or rebound tenderness, no palpable masses   Extremities: wwp; no cyanosis, clubbing or edema.   Vascular: Pulses equal and strong throughout.   Neurological: AAOx3, no CN deficits, strength and sensation intact throughout.   Skin: No gross skin abnormalities or rashes        LABS:                        12.3   8.84  )-----------( 260      ( 20 Jul 2022 04:39 )             36.6     07-19    137  |  104  |  15  ----------------------------<  290<H>  4.8   |  23  |  0.79    Ca    8.8      19 Jul 2022 15:36    TPro  7.2  /  Alb  4.3  /  TBili  0.3  /  DBili  x   /  AST  22  /  ALT  23  /  AlkPhos  72  07-19    PT/INR - ( 19 Jul 2022 15:36 )   PT: 11.8 sec;   INR: 0.99          PTT - ( 19 Jul 2022 17:29 )  PTT:25.2 sec        RADIOLOGY & ADDITIONAL TESTS:    MEDICATIONS  (STANDING):  apixaban 5 milliGRAM(s) Oral every 12 hours  aspirin enteric coated 81 milliGRAM(s) Oral daily  atorvastatin 80 milliGRAM(s) Oral at bedtime  chlorhexidine 4% Liquid 1 Application(s) Topical Once  clopidogrel Tablet 75 milliGRAM(s) Oral daily  dextrose 5%. 1000 milliLiter(s) (100 mL/Hr) IV Continuous <Continuous>  dextrose 5%. 1000 milliLiter(s) (50 mL/Hr) IV Continuous <Continuous>  dextrose 50% Injectable 25 Gram(s) IV Push Once  dextrose 50% Injectable 12.5 Gram(s) IV Push Once  dextrose 50% Injectable 25 Gram(s) IV Push Once  glucagon  Injectable 1 milliGRAM(s) IntraMuscular Once  insulin glargine Injectable (LANTUS) 32 Unit(s) SubCutaneous at bedtime  insulin lispro (ADMELOG) corrective regimen sliding scale   SubCutaneous Before meals and at bedtime  insulin lispro Injectable (ADMELOG) 12 Unit(s) SubCutaneous three times a day before meals  isosorbide   mononitrate ER Tablet (IMDUR) 30 milliGRAM(s) Oral daily  levothyroxine 50 MICROGram(s) Oral daily  melatonin 5 milliGRAM(s) Oral at bedtime  metoprolol tartrate 25 milliGRAM(s) Oral two times a day  nitroglycerin  Infusion 5 MICROgram(s)/Min (1.5 mL/Hr) IV Continuous <Continuous>  ranolazine 500 milliGRAM(s) Oral two times a day    MEDICATIONS  (PRN):  dextrose Oral Gel 15 Gram(s) Oral Once PRN Blood Glucose LESS THAN 70 milliGRAM(s)/deciliter  
HPI: 54 y/o male former smoker with PMHx of HTN, HLD, pAfib (s/p DCCV in 2018, on Eliquis, last dose 07/16/2022), pSVT, CAD s/p PCI (most recent laser atherectomy/MARTIN x 2 prox and mid LAD ISR @ [facility] 2/1/19), DM II, and hypothyroidism who presented to cardiologist Dr. Mendelson with reports of BLANCHARD with bicycling and walking uphill which did not improve with addition of Ranexa to his regimen. Pt denies fever, chills, cough, palpitations, CP, orthopnea, LE edema, abdominal pain, N/V/D, dizziness, syncope. Per MD note pt underwent nuclear stress test that was ? false positive. Pt was subsequently referred to [facility] for cardiac cath 7/6/22 which revealed LM luminal irregularities, pLAD 60% ISR, mLAD 80% ISR, prox to mid LCx 30%, prox to mid RCA 30%, EDP 15mmHg. In light of pt’s risk factors, CCS class II anginal symptoms and recurrent ISR of LAD pt is referred to [facility] for laser atherectomy and brachytherapy.     On 7/19, a PCI with brachytherapy was performed, one MARTIN was placed in the mid LAD. During the procedure, the pt stating he started experiencing 10/10 chest pain which radiated down his R arm.     INTERIM EVENTS: Pt states the pain has improved to a 3/10 and no longer radiates down his arm; pt still endorses nausea.     SUBJECTIVE:  Patient seen and examined at bedside.  ROS  Patient denies fever, chills, palpitations, SOB, lightheadedness, dizziness, abd pain, leg swelling, rashes, dysuria, and changes in BM. Pt endorses 5/10 central chest pain which radiates down his R arm and nausea.    Vital Signs Last 12 Hrs  T(F): 98.3 (07-19-22 @ 16:15), Max: 98.3 (07-19-22 @ 16:15)  HR: 85 (07-19-22 @ 17:04) (80 - 86)  BP: 154/82 (07-19-22 @ 17:04) (134/81 - 154/82)  BP(mean): 112 (07-19-22 @ 17:04) (98 - 112)  RR: 17 (07-19-22 @ 17:04) (17 - 19)  SpO2: 98% (07-19-22 @ 17:04) (96% - 98%)  I&O's Summary    19 Jul 2022 07:01  -  19 Jul 2022 17:34  --------------------------------------------------------  IN: 18 mL / OUT: 425 mL / NET: -407 mL        PHYSICAL EXAM:  Constitutional: NAD, comfortable in bed.  HEENT: NC/AT, PERRLA, EOMI, no conjunctival pallor or scleral icterus, MMM  Neck: Supple, no JVD  Respiratory: CTA B/L. No w/r/r.   Cardiovascular: RRR, normal S1 and S2, no m/r/g.   Gastrointestinal: +BS, soft NTND, no guarding or rebound tenderness, no palpable masses   Extremities: wwp; no cyanosis, clubbing or edema.   Vascular: Pulses equal and strong throughout.   Neurological: AAOx3, no CN deficits, strength and sensation intact throughout.   Skin: No gross skin abnormalities or rashes        LABS:                        13.1   8.28  )-----------( 252      ( 19 Jul 2022 15:36 )             38.2     07-19    137  |  104  |  15  ----------------------------<  290<H>  4.8   |  23  |  0.79    Ca    8.8      19 Jul 2022 15:36    TPro  7.2  /  Alb  4.3  /  TBili  0.3  /  DBili  x   /  AST  22  /  ALT  23  /  AlkPhos  72  07-19    PT/INR - ( 19 Jul 2022 15:36 )   PT: 11.8 sec;   INR: 0.99          PTT - ( 19 Jul 2022 15:36 )  PTT:49.6 sec        RADIOLOGY & ADDITIONAL TESTS:    MEDICATIONS  (STANDING):  aspirin enteric coated 81 milliGRAM(s) Oral daily  atorvastatin 80 milliGRAM(s) Oral at bedtime  chlorhexidine 4% Liquid 1 Application(s) Topical Once  dextrose 5%. 1000 milliLiter(s) (100 mL/Hr) IV Continuous <Continuous>  dextrose 5%. 1000 milliLiter(s) (50 mL/Hr) IV Continuous <Continuous>  dextrose 50% Injectable 25 Gram(s) IV Push Once  dextrose 50% Injectable 12.5 Gram(s) IV Push Once  dextrose 50% Injectable 25 Gram(s) IV Push Once  diltiazem    milliGRAM(s) Oral daily  glucagon  Injectable 1 milliGRAM(s) IntraMuscular Once  insulin glargine Injectable (LANTUS) 32 Unit(s) SubCutaneous at bedtime  insulin lispro (ADMELOG) corrective regimen sliding scale   SubCutaneous Before meals and at bedtime  insulin lispro Injectable (ADMELOG) 12 Unit(s) SubCutaneous three times a day before meals  levothyroxine 50 MICROGram(s) Oral daily  nitroglycerin  Infusion 5 MICROgram(s)/Min (1.5 mL/Hr) IV Continuous <Continuous>  ranolazine 500 milliGRAM(s) Oral two times a day  sotalol 160 milliGRAM(s) Oral every 12 hours    MEDICATIONS  (PRN):  dextrose Oral Gel 15 Gram(s) Oral Once PRN Blood Glucose LESS THAN 70 milliGRAM(s)/deciliter

## 2022-07-20 NOTE — PROGRESS NOTE ADULT - ASSESSMENT
52 y/o male former smoker with PMHx of HTN, HLD, pAfib (s/p DCCV in 2018, on Eliquis, last dose 07/16/2022), pSVT, CAD s/p PCI (most recent laser atherectomy/MARTIN x 2 prox and mid LAD ISR @ [facility] 2/1/19), DM II, and hypothyroidism admitted to CCU for post-cath monitoring i/s/o chest pain.     An US was performed and did not demonstrate concerning findings, so concern for stent restenosis and vessel dissection is lower. An EKG was performed, and 1mm ST elevations in leads V1 and V2 were noted. Thus, nitroglycerin gtt  was started. Pt cardiac enzymes uptrended steadily overnight but CP resolved and EKG stabilized.     Plan:  Neuro:  - Pt mentating well, A+Ox3  - No active issues    Cardio:  #r/o myocardial ischemia s/p PCI w/ MARTIN insertion  - During PCI, mid LAD MARTIN was placed. Pt developed 10/10 chest pain radiating down R arm associated with nausea during PCI procedure. US was performed and did not demonstrate concerning findings. Initial EKG demonstrated 1mm ST elevations in leads V1 and V2. Nitroglycerin gtt was started.  - Trop, CKMB, CK steadily uptrended over night but these findings can be normal after PCI with MARTIN placement. Clinically pt is improving and his ekg has stabilized.    - EKG Q6hour  - Trend Troponin and CKMB to peak  - c/w ASA 81 Qd and Ranolazine 500 BID  - c/w Nitroglycerin gtt, decrease today as tolerated. Currently at 25mcg/hr  - Started Imdur 30 Qd      #Afib  - Pt on eliquis outpt, s/p DCCV 2018  - Restarted home meds Eliquis 5Q12, ASA 81 Qd, and Clopidigrel 75 Qd.  - Started Lopressor 25 BID  - Held home meds Diltiazem 120 Qd and Sotalol 160 BID  - EP consulted for best medical management of recurrent afib i/s/o CAD s/p PCI c/b CP and elevated cardiac enzymes    #HTN   Home meds: Ramipril 10 Qd    - Inpt Lisonopril 40 Qd ordered, hold until AM    #HLD  Home meds: Atorvastatin 80 Qd    -c/w Atorvastatin 80 Qd      Pulmonary:   - No active issues    GI:  - No active issues    Renal/:  - No active issues    Endo:  #Diabetes Mellitus:  - c/w Lantus 32U Qd, Lispro 12U TID, and ISS  - A1c 10.7  - Endo consult placed for better inpt and outpt management of uncontrolled DMII i/s/o CAD    - f/u Endo recs  - Monitor FS glucose and adjust regimen accordingly    #Hypothyroidism  - c/w Levothyroxine 50mcg Qd  - Monitor clinically    ID:  - No active issues    Heme/Onc:  - No active issues    Dispo  F: None  E: Replete as needed, K>4 and Mg>2  N: DASH/TLC and consistent carbohydrate diet  GI PPX: None  DVT PPX: Eliquis 5 Q12  DIspo: CCU  Code:

## 2022-07-20 NOTE — CONSULT NOTE ADULT - PROBLEM SELECTOR RECOMMENDATION 9
Please continue lantus       units at night / morning.  Please continue lispro      units before each meal.  Please continue lispro moderate / low dose sliding scale four times daily with meals and at bedtime    Pt's fingerstick glucose goal is     Will continue to monitor     For discharge, pt can continue    Pt can follow up at discharge with [facility] by calling  to make an appointment.   Will discuss case with     and update primary team Please increase lantus  to 38     units at night.  Please continue lispro   12   units before each meal.  Please continue lispro moderate dose sliding scale four times daily with meals and at bedtime    Pt's fingerstick glucose goal is 100-180.    Will continue to monitor     For discharge, pt can continue    Pt can follow up at discharge with [facility] by calling  to make an appointment.   Will discuss case with Dr. Feliz   and update primary team

## 2022-07-20 NOTE — CONSULT NOTE ADULT - SUBJECTIVE AND OBJECTIVE BOX
HPI: : 54 y/o male former smoker with PMHx of HTN, HLD, pAfib (s/p DCCV in 2018, on Eliquis, last dose 07/16/2022), pSVT, CAD s/p PCI (most recent laser atherectomy/MARTIN x 2 prox and mid LAD ISR @ [facility] 2/1/19), DM II, and hypothyroidism who presented to cardiologist Dr. Mendelson with reports of BLANCHARD with bicycling and walking uphill which did not improve with addition of Ranexa to his regimen. Pt denies fever, chills, cough, palpitations, CP, orthopnea, LE edema, abdominal pain, N/V/D, dizziness, syncope. Per MD note pt underwent nuclear stress test that was ? false positive. Pt was subsequently referred to [facility] for cardiac cath 7/6/22 which revealed LM luminal irregularities, pLAD 60% ISR, mLAD 80% ISR, prox to mid LCx 30%, prox to mid RCA 30%, EDP 15mmHg. In light of pt’s risk factors, CCS class II anginal symptoms and recurrent ISR of LAD pt is referred to [facility] for laser atherectomy and brachytherapy.     On 7/19, a PCI with brachytherapy was performed, one MARTIN was placed in the mid LAD. During the procedure, the pt stating he started experiencing 10/10 chest pain which radiated down his R arm.     An US was performed and did not demonstrate concerning findings, so concern for stent restenosis and vessel dissection is lower. An EKG was performed, and 1mm ST elevations in leads V1 and V2 were noted. Thus, nitroglycerin gtt 1.5ml/hr was started. Since then, his pain has decreased to a 7/10 and then to 5/10 currently.    Endocrine consulted for uncotnrolled T2DM with hyperglycemia. A1C 10.7%  Seen By Dr James in 2019:  Pt reports that he was previously on insulin but stopped taking it because it was not covered by his insurance and because he did not feel that he was getting his diabetes 'cured'.  He now only takes metformin 500mg twice daily.  He does not check his glucose at home and he endorses a high carbohydrate diet and no regular physical activity.  he has no hx of hypoglycemia or admissions for DKA or HHS.  He follows with a primary care doctor, but previously has seen endocrinologists.  He denies hx of retinopathy, neuropathy, erectile dysfunction and denies blurry vision, numbness and tingling in his feet, nausea or vomiting after meals.  He endorses recent weight gain, denies excessive thirst, hunger, or polyuria.    He has a hx of hypothyroidism for which he is prescribed 50mcg synthroid once daily, and he does not know the etioglogy of his thyroid disease.     FSG & insulin:    Dinner FSG   Lispro   +    Ate  Bedtime FSG     Lantus      and Lispro         Breakfast FSG   Lispro    +    Ate  Lunch FSG      Lispro    +    Ate      Age at Dx:  How dx:  Hx and duration of insulin:  Current Therapy:  Hx of other regimens:    Home FSG:  Fasting  Lunch  Dinner  Bed    Diet:  Exercise:    Hx of hypoglycemia:  Hx of DKA/HHS:  Complications:  Outpatient Endo:    FH:  DM:  Thyroid:  Autoimmune:  Other:    SH:  Smoking  Etoh:  Recreational Drugs:  Social Life:    PMH & Surgical Hx:  Family history of heart failure (Father)  Family history of myocardial infarction (Father)  CAD (coronary artery disease)  HTN (hypertension)  Afib  DM (diabetes mellitus)  SVT (supraventricular tachycardia)  HLD (hyperlipidemia)  Hypothyroid  Amputation of finger, initial encounter  Mandible fracture  S/P drug eluting coronary stent placement  Previous back surgery  S/P nasal septoplasty    Current Meds:  apixaban 5 milliGRAM(s) Oral every 12 hours  aspirin enteric coated 81 milliGRAM(s) Oral daily  atorvastatin 80 milliGRAM(s) Oral at bedtime  chlorhexidine 4% Liquid 1 Application(s) Topical Once  clopidogrel Tablet 75 milliGRAM(s) Oral daily  dextrose 5%. 1000 milliLiter(s) IV Continuous <Continuous>  dextrose 5%. 1000 milliLiter(s) IV Continuous <Continuous>  dextrose 50% Injectable 25 Gram(s) IV Push Once  dextrose 50% Injectable 12.5 Gram(s) IV Push Once  dextrose 50% Injectable 25 Gram(s) IV Push Once  dextrose Oral Gel 15 Gram(s) Oral Once PRN  glucagon  Injectable 1 milliGRAM(s) IntraMuscular Once  insulin glargine Injectable (LANTUS) 32 Unit(s) SubCutaneous at bedtime  insulin lispro (ADMELOG) corrective regimen sliding scale   SubCutaneous Before meals and at bedtime  insulin lispro Injectable (ADMELOG) 12 Unit(s) SubCutaneous three times a day before meals  isosorbide   mononitrate ER Tablet (IMDUR) 30 milliGRAM(s) Oral daily  levothyroxine 50 MICROGram(s) Oral daily  melatonin 5 milliGRAM(s) Oral at bedtime  metoprolol tartrate 25 milliGRAM(s) Oral two times a day  nitroglycerin  Infusion 5 MICROgram(s)/Min IV Continuous <Continuous>  ranolazine 500 milliGRAM(s) Oral two times a day      Allergies:  No Known Allergies      ROS:  Denies the following except as indicated.    General: weight loss/weight gain, decreased appetite, fatigue  Eyes: Blurry vision, double vision, visual changes  ENT: Throat pain, changes in voice,   CV: palpitations, SOB, CP, cough  GI: NVD, difficulty swallowing, abdominal pain  : polyuria, dysuria  Endo: abnormal menses, temperature intolerance, decreased libido  MSK: weakness, joint pain  Skin: rash, dryness, diaphoresis  Heme: Easy bruising, bleeding  Neuro: HA, dizziness, lightheadedness, numbness/ tingling  Psych: Anxiety, Depression    Vital Signs Last 24 Hrs  T(C): 36.9 (20 Jul 2022 08:59), Max: 36.9 (20 Jul 2022 08:59)  T(F): 98.5 (20 Jul 2022 08:59), Max: 98.5 (20 Jul 2022 08:59)  HR: 66 (20 Jul 2022 10:00) (60 - 86)  BP: 115/61 (20 Jul 2022 10:00) (102/57 - 154/82)  BP(mean): 82 (20 Jul 2022 10:00) (74 - 112)  RR: 22 (20 Jul 2022 10:00) (16 - 26)  SpO2: 93% (20 Jul 2022 10:00) (93% - 98%)    Parameters below as of 20 Jul 2022 10:00  Patient On (Oxygen Delivery Method): room air      Height (cm): 167.6 (07-19 @ 09:29)  Weight (kg): 106.6 (07-19 @ 09:29)  BMI (kg/m2): 37.9 (07-19 @ 09:29)      Constitutional: wn/wd in NAD.   HEENT: NCAT, MMM, OP clear, EOMI, , no proptosis or lid retraction  Neck: no thyromegaly or palpable thyroid nodules   Respiratory: lungs CTAB.  Cardiovascular: regular rhythm, normal S1 and S2, no audible murmurs, no peripheral edema  GI: soft, NT/ND, no masses/HSM appreciated.  Neurology: no tremors, DTR 2+  Skin: no visible rashes/lesions. no acanthosis nigricans. no hyperlipotrophy. no cushing's stigmata.  Psychiatric: AAO x 3, normal affect/mood.  Ext: radial pulses intact, DP pulses intact, extremities warm, no cyanosis, clubbing or edema.       LABS:                        12.3   8.84  )-----------( 260      ( 20 Jul 2022 04:39 )             36.6     07-19    137  |  104  |  15  ----------------------------<  290<H>  4.8   |  23  |  0.79    Ca    8.8      19 Jul 2022 15:36    TPro  7.2  /  Alb  4.3  /  TBili  0.3  /  DBili  x   /  AST  22  /  ALT  23  /  AlkPhos  72  07-19    PT/INR - ( 19 Jul 2022 15:36 )   PT: 11.8 sec;   INR: 0.99          PTT - ( 19 Jul 2022 17:29 )  PTT:25.2 sec          RADIOLOGY & ADDITIONAL STUDIES:  CAPILLARY BLOOD GLUCOSE      POCT Blood Glucose.: 241 mg/dL (20 Jul 2022 07:20)  POCT Blood Glucose.: 174 mg/dL (19 Jul 2022 21:00)  POCT Blood Glucose.: 263 mg/dL (19 Jul 2022 16:12)  POCT Blood Glucose.: 246 mg/dL (19 Jul 2022 12:25)     HPI: : 52 y/o male former smoker with PMHx of HTN, HLD, pAfib (s/p DCCV in 2018, on Eliquis, last dose 07/16/2022), pSVT, CAD s/p PCI (most recent laser atherectomy/MARTIN x 2 prox and mid LAD ISR @ [facility] 2/1/19), DM II, and hypothyroidism who presented to cardiologist Dr. Mendelson with reports of BLANCHARD with bicycling and walking uphill which did not improve with addition of Ranexa to his regimen. Pt denies fever, chills, cough, palpitations, CP, orthopnea, LE edema, abdominal pain, N/V/D, dizziness, syncope. Per MD note pt underwent nuclear stress test that was ? false positive. Pt was subsequently referred to [facility] for cardiac cath 7/6/22 which revealed LM luminal irregularities, pLAD 60% ISR, mLAD 80% ISR, prox to mid LCx 30%, prox to mid RCA 30%, EDP 15mmHg. In light of pt’s risk factors, CCS class II anginal symptoms and recurrent ISR of LAD pt is referred to [facility] for laser atherectomy and brachytherapy.     On 7/19, a PCI with brachytherapy was performed, one MARTIN was placed in the mid LAD. During the procedure, the pt stating he started experiencing 10/10 chest pain which radiated down his R arm. An US was performed and did not demonstrate concerning findings, so concern for stent restenosis and vessel dissection is lower. An EKG was performed, and 1mm ST elevations in leads V1 and V2 were noted. Thus, nitroglycerin gtt 1.5ml/hr was started. Since then, his pain has decreased to a 7/10 and then to 5/10 currently.  Now trending trops.    He denies chest pain. Appetite is poor. Denies nausea.     He takes Basaglar 32 units HS, novolog 12 units TIDAC and metformin 1000mg twice daily.  He does not check his glucose at home. He previously had freestyle weston, but is no longer covered. He endorses a high carbohydrate diet and no regular physical activity.  He has no hx of hypoglycemia or admissions for DKA or HHS. He follows with a primary care doctor, but previously has seen endocrinologists.  He denies hx of retinopathy, neuropathy, erectile dysfunction and denies blurry vision, numbness and tingling in his feet, nausea or vomiting after meals.  He endorses recent weight gain, denies excessive thirst, hunger, or polyuria.    He has a hx of hypothyroidism for which he is prescribed 50mcg synthroid once daily, and he does not know the etiology of his thyroid disease.     FSG & insulin:  Yesterday:  Dinner FSG  263 Lispro 12  + 6   Ate cod no starch  Bedtime FSG  174   Lantus  32    and Lispro  2     Todya  Breakfast FSG  241 Lispro 4  NPO  Lunch      Lispro    +    Ate      PMH & Surgical Hx:  Family history of heart failure (Father)  Family history of myocardial infarction (Father)  CAD (coronary artery disease)  HTN (hypertension)  Afib  DM (diabetes mellitus)  SVT (supraventricular tachycardia)  HLD (hyperlipidemia)  Hypothyroid  Amputation of finger, initial encounter  Mandible fracture  S/P drug eluting coronary stent placement  Previous back surgery  S/P nasal septoplasty    Current Meds:  apixaban 5 milliGRAM(s) Oral every 12 hours  aspirin enteric coated 81 milliGRAM(s) Oral daily  atorvastatin 80 milliGRAM(s) Oral at bedtime  chlorhexidine 4% Liquid 1 Application(s) Topical Once  clopidogrel Tablet 75 milliGRAM(s) Oral daily  dextrose 5%. 1000 milliLiter(s) IV Continuous <Continuous>  dextrose 5%. 1000 milliLiter(s) IV Continuous <Continuous>  dextrose 50% Injectable 25 Gram(s) IV Push Once  dextrose 50% Injectable 12.5 Gram(s) IV Push Once  dextrose 50% Injectable 25 Gram(s) IV Push Once  dextrose Oral Gel 15 Gram(s) Oral Once PRN  glucagon  Injectable 1 milliGRAM(s) IntraMuscular Once  insulin glargine Injectable (LANTUS) 32 Unit(s) SubCutaneous at bedtime  insulin lispro (ADMELOG) corrective regimen sliding scale   SubCutaneous Before meals and at bedtime  insulin lispro Injectable (ADMELOG) 12 Unit(s) SubCutaneous three times a day before meals  isosorbide   mononitrate ER Tablet (IMDUR) 30 milliGRAM(s) Oral daily  levothyroxine 50 MICROGram(s) Oral daily  melatonin 5 milliGRAM(s) Oral at bedtime  metoprolol tartrate 25 milliGRAM(s) Oral two times a day  nitroglycerin  Infusion 5 MICROgram(s)/Min IV Continuous <Continuous>  ranolazine 500 milliGRAM(s) Oral two times a day      Allergies:  No Known Allergies      ROS:  Denies the following except as indicated.    General: weight loss/weight gain, fatigue  Eyes: Blurry vision, double vision, visual changes  ENT: Throat pain, changes in voice,   CV: palpitations, SOB, CP, cough  GI: NVD, difficulty swallowing, abdominal pain  : polyuria, dysuria  Endo:  temperature intolerance  MSK: weakness, joint pain  Skin: rash, dryness, diaphoresis  Heme: Easy bruising, bleeding  Neuro: HA, dizziness, lightheadedness, numbness/ tingling  Psych: Anxiety, Depression    Vital Signs Last 24 Hrs  T(C): 36.9 (20 Jul 2022 08:59), Max: 36.9 (20 Jul 2022 08:59)  T(F): 98.5 (20 Jul 2022 08:59), Max: 98.5 (20 Jul 2022 08:59)  HR: 66 (20 Jul 2022 10:00) (60 - 86)  BP: 115/61 (20 Jul 2022 10:00) (102/57 - 154/82)  BP(mean): 82 (20 Jul 2022 10:00) (74 - 112)  RR: 22 (20 Jul 2022 10:00) (16 - 26)  SpO2: 93% (20 Jul 2022 10:00) (93% - 98%)    Parameters below as of 20 Jul 2022 10:00  Patient On (Oxygen Delivery Method): room air      Height (cm): 167.6 (07-19 @ 09:29)  Weight (kg): 106.6 (07-19 @ 09:29)  BMI (kg/m2): 37.9 (07-19 @ 09:29)      Constitutional: NAD.   HEENT: NCAT, MMM, OP clear, EOMI, , no proptosis or lid retraction  Neck: no thyromegaly or palpable thyroid nodules   Respiratory: lungs CTAB.  Cardiovascular: regular rhythm, normal S1 and S2, no audible murmurs, no peripheral edema  GI: soft, NT/ND, no masses/HSM appreciated.  Neurology: no tremors, DTR 2+  Skin: no visible rashes/lesions.   Psychiatric: AAO x 3, normal affect/mood.  Ext: radial pulses intact, DP pulses intact, extremities warm, no cyanosis, clubbing or edema.       LABS:                        12.3   8.84  )-----------( 260      ( 20 Jul 2022 04:39 )             36.6     07-19    137  |  104  |  15  ----------------------------<  290<H>  4.8   |  23  |  0.79    Ca    8.8      19 Jul 2022 15:36    TPro  7.2  /  Alb  4.3  /  TBili  0.3  /  DBili  x   /  AST  22  /  ALT  23  /  AlkPhos  72  07-19    PT/INR - ( 19 Jul 2022 15:36 )   PT: 11.8 sec;   INR: 0.99          PTT - ( 19 Jul 2022 17:29 )  PTT:25.2 sec          RADIOLOGY & ADDITIONAL STUDIES:  CAPILLARY BLOOD GLUCOSE      POCT Blood Glucose.: 241 mg/dL (20 Jul 2022 07:20)  POCT Blood Glucose.: 174 mg/dL (19 Jul 2022 21:00)  POCT Blood Glucose.: 263 mg/dL (19 Jul 2022 16:12)  POCT Blood Glucose.: 246 mg/dL (19 Jul 2022 12:25)

## 2022-07-21 ENCOUNTER — TRANSCRIPTION ENCOUNTER (OUTPATIENT)
Age: 57
End: 2022-07-21

## 2022-07-21 VITALS
DIASTOLIC BLOOD PRESSURE: 57 MMHG | SYSTOLIC BLOOD PRESSURE: 114 MMHG | RESPIRATION RATE: 20 BRPM | TEMPERATURE: 98 F | OXYGEN SATURATION: 99 % | HEART RATE: 75 BPM

## 2022-07-21 LAB
ALBUMIN SERPL ELPH-MCNC: 3.7 G/DL — SIGNIFICANT CHANGE UP (ref 3.3–5)
ALP SERPL-CCNC: 66 U/L — SIGNIFICANT CHANGE UP (ref 40–120)
ALT FLD-CCNC: 26 U/L — SIGNIFICANT CHANGE UP (ref 10–45)
ANION GAP SERPL CALC-SCNC: 8 MMOL/L — SIGNIFICANT CHANGE UP (ref 5–17)
AST SERPL-CCNC: 119 U/L — HIGH (ref 10–40)
BILIRUB SERPL-MCNC: 0.7 MG/DL — SIGNIFICANT CHANGE UP (ref 0.2–1.2)
BUN SERPL-MCNC: 11 MG/DL — SIGNIFICANT CHANGE UP (ref 7–23)
CALCIUM SERPL-MCNC: 8.7 MG/DL — SIGNIFICANT CHANGE UP (ref 8.4–10.5)
CHLORIDE SERPL-SCNC: 100 MMOL/L — SIGNIFICANT CHANGE UP (ref 96–108)
CO2 SERPL-SCNC: 25 MMOL/L — SIGNIFICANT CHANGE UP (ref 22–31)
CREAT SERPL-MCNC: 0.82 MG/DL — SIGNIFICANT CHANGE UP (ref 0.5–1.3)
EGFR: 102 ML/MIN/1.73M2 — SIGNIFICANT CHANGE UP
GLUCOSE BLDC GLUCOMTR-MCNC: 115 MG/DL — HIGH (ref 70–99)
GLUCOSE BLDC GLUCOMTR-MCNC: 188 MG/DL — HIGH (ref 70–99)
GLUCOSE SERPL-MCNC: 120 MG/DL — HIGH (ref 70–99)
HCT VFR BLD CALC: 37.8 % — LOW (ref 39–50)
HGB BLD-MCNC: 12.8 G/DL — LOW (ref 13–17)
MAGNESIUM SERPL-MCNC: 1.6 MG/DL — SIGNIFICANT CHANGE UP (ref 1.6–2.6)
MCHC RBC-ENTMCNC: 27.1 PG — SIGNIFICANT CHANGE UP (ref 27–34)
MCHC RBC-ENTMCNC: 33.9 GM/DL — SIGNIFICANT CHANGE UP (ref 32–36)
MCV RBC AUTO: 79.9 FL — LOW (ref 80–100)
NRBC # BLD: 0 /100 WBCS — SIGNIFICANT CHANGE UP (ref 0–0)
PHOSPHATE SERPL-MCNC: 2.7 MG/DL — SIGNIFICANT CHANGE UP (ref 2.5–4.5)
PLATELET # BLD AUTO: 239 K/UL — SIGNIFICANT CHANGE UP (ref 150–400)
POTASSIUM SERPL-MCNC: 3.7 MMOL/L — SIGNIFICANT CHANGE UP (ref 3.5–5.3)
POTASSIUM SERPL-SCNC: 3.7 MMOL/L — SIGNIFICANT CHANGE UP (ref 3.5–5.3)
PROT SERPL-MCNC: 6.8 G/DL — SIGNIFICANT CHANGE UP (ref 6–8.3)
RBC # BLD: 4.73 M/UL — SIGNIFICANT CHANGE UP (ref 4.2–5.8)
RBC # FLD: 13.8 % — SIGNIFICANT CHANGE UP (ref 10.3–14.5)
SODIUM SERPL-SCNC: 133 MMOL/L — LOW (ref 135–145)
WBC # BLD: 9.17 K/UL — SIGNIFICANT CHANGE UP (ref 3.8–10.5)
WBC # FLD AUTO: 9.17 K/UL — SIGNIFICANT CHANGE UP (ref 3.8–10.5)

## 2022-07-21 PROCEDURE — C1753: CPT

## 2022-07-21 PROCEDURE — 85347 COAGULATION TIME ACTIVATED: CPT

## 2022-07-21 PROCEDURE — 83735 ASSAY OF MAGNESIUM: CPT

## 2022-07-21 PROCEDURE — C1874: CPT

## 2022-07-21 PROCEDURE — 71045 X-RAY EXAM CHEST 1 VIEW: CPT

## 2022-07-21 PROCEDURE — C1728: CPT

## 2022-07-21 PROCEDURE — 83036 HEMOGLOBIN GLYCOSYLATED A1C: CPT

## 2022-07-21 PROCEDURE — C1725: CPT

## 2022-07-21 PROCEDURE — 93005 ELECTROCARDIOGRAM TRACING: CPT

## 2022-07-21 PROCEDURE — C1894: CPT

## 2022-07-21 PROCEDURE — 82962 GLUCOSE BLOOD TEST: CPT

## 2022-07-21 PROCEDURE — 77316 BRACHYTX ISODOSE PLAN SIMPLE: CPT

## 2022-07-21 PROCEDURE — C1887: CPT

## 2022-07-21 PROCEDURE — 85730 THROMBOPLASTIN TIME PARTIAL: CPT

## 2022-07-21 PROCEDURE — C1769: CPT

## 2022-07-21 PROCEDURE — 77370 RADIATION PHYSICS CONSULT: CPT

## 2022-07-21 PROCEDURE — 99233 SBSQ HOSP IP/OBS HIGH 50: CPT

## 2022-07-21 PROCEDURE — 77290 THER RAD SIMULAJ FIELD CPLX: CPT

## 2022-07-21 PROCEDURE — 36415 COLL VENOUS BLD VENIPUNCTURE: CPT

## 2022-07-21 PROCEDURE — 77770 HDR RDNCL NTRSTL/ICAV BRCHTX: CPT

## 2022-07-21 PROCEDURE — 93308 TTE F-UP OR LMTD: CPT

## 2022-07-21 PROCEDURE — 85025 COMPLETE CBC W/AUTO DIFF WBC: CPT

## 2022-07-21 PROCEDURE — 82553 CREATINE MB FRACTION: CPT

## 2022-07-21 PROCEDURE — 71045 X-RAY EXAM CHEST 1 VIEW: CPT | Mod: 26

## 2022-07-21 PROCEDURE — 84484 ASSAY OF TROPONIN QUANT: CPT

## 2022-07-21 PROCEDURE — 82550 ASSAY OF CK (CPK): CPT

## 2022-07-21 PROCEDURE — 85027 COMPLETE CBC AUTOMATED: CPT

## 2022-07-21 PROCEDURE — 80061 LIPID PANEL: CPT

## 2022-07-21 PROCEDURE — 84100 ASSAY OF PHOSPHORUS: CPT

## 2022-07-21 PROCEDURE — 85610 PROTHROMBIN TIME: CPT

## 2022-07-21 PROCEDURE — 77470 SPECIAL RADIATION TREATMENT: CPT

## 2022-07-21 PROCEDURE — 80053 COMPREHEN METABOLIC PANEL: CPT

## 2022-07-21 PROCEDURE — C1717: CPT

## 2022-07-21 PROCEDURE — C8929: CPT

## 2022-07-21 RX ORDER — CHLORHEXIDINE GLUCONATE 213 G/1000ML
1 SOLUTION TOPICAL
Refills: 0 | Status: DISCONTINUED | OUTPATIENT
Start: 2022-07-21 | End: 2022-07-21

## 2022-07-21 RX ORDER — METOPROLOL TARTRATE 50 MG
1 TABLET ORAL
Qty: 60 | Refills: 0
Start: 2022-07-21 | End: 2022-08-19

## 2022-07-21 RX ORDER — DILTIAZEM HCL 120 MG
1 CAPSULE, EXT RELEASE 24 HR ORAL
Qty: 0 | Refills: 0 | DISCHARGE

## 2022-07-21 RX ORDER — METOPROLOL TARTRATE 50 MG
25 TABLET ORAL ONCE
Refills: 0 | Status: COMPLETED | OUTPATIENT
Start: 2022-07-21 | End: 2022-07-21

## 2022-07-21 RX ORDER — ISOSORBIDE MONONITRATE 60 MG/1
1 TABLET, EXTENDED RELEASE ORAL
Qty: 30 | Refills: 0
Start: 2022-07-21

## 2022-07-21 RX ORDER — POTASSIUM CHLORIDE 20 MEQ
40 PACKET (EA) ORAL ONCE
Refills: 0 | Status: COMPLETED | OUTPATIENT
Start: 2022-07-21 | End: 2022-07-21

## 2022-07-21 RX ORDER — ASPIRIN/CALCIUM CARB/MAGNESIUM 324 MG
1 TABLET ORAL
Qty: 0 | Refills: 0 | DISCHARGE
Start: 2022-07-21

## 2022-07-21 RX ORDER — MAGNESIUM SULFATE 500 MG/ML
2 VIAL (ML) INJECTION ONCE
Refills: 0 | Status: COMPLETED | OUTPATIENT
Start: 2022-07-21 | End: 2022-07-21

## 2022-07-21 RX ORDER — LISINOPRIL 2.5 MG/1
1 TABLET ORAL
Qty: 30 | Refills: 0
Start: 2022-07-21

## 2022-07-21 RX ORDER — SOTALOL HCL 120 MG
1 TABLET ORAL
Qty: 0 | Refills: 0 | DISCHARGE

## 2022-07-21 RX ORDER — RAMIPRIL 5 MG
1 CAPSULE ORAL
Qty: 0 | Refills: 0 | DISCHARGE

## 2022-07-21 RX ORDER — ENOXAPARIN SODIUM 100 MG/ML
38 INJECTION SUBCUTANEOUS
Qty: 110000 | Refills: 3
Start: 2022-07-21 | End: 2022-11-17

## 2022-07-21 RX ORDER — LISINOPRIL 2.5 MG/1
10 TABLET ORAL ONCE
Refills: 0 | Status: COMPLETED | OUTPATIENT
Start: 2022-07-21 | End: 2022-07-21

## 2022-07-21 RX ORDER — METOPROLOL TARTRATE 50 MG
50 TABLET ORAL
Refills: 0 | Status: DISCONTINUED | OUTPATIENT
Start: 2022-07-21 | End: 2022-07-21

## 2022-07-21 RX ADMIN — Medication 2: at 11:35

## 2022-07-21 RX ADMIN — Medication 40 MILLIEQUIVALENT(S): at 08:46

## 2022-07-21 RX ADMIN — Medication 81 MILLIGRAM(S): at 11:36

## 2022-07-21 RX ADMIN — Medication 25 MILLIGRAM(S): at 05:14

## 2022-07-21 RX ADMIN — LISINOPRIL 10 MILLIGRAM(S): 2.5 TABLET ORAL at 09:58

## 2022-07-21 RX ADMIN — Medication 12 UNIT(S): at 11:35

## 2022-07-21 RX ADMIN — ISOSORBIDE MONONITRATE 30 MILLIGRAM(S): 60 TABLET, EXTENDED RELEASE ORAL at 11:36

## 2022-07-21 RX ADMIN — Medication 25 GRAM(S): at 06:33

## 2022-07-21 RX ADMIN — RANOLAZINE 500 MILLIGRAM(S): 500 TABLET, FILM COATED, EXTENDED RELEASE ORAL at 05:13

## 2022-07-21 RX ADMIN — CLOPIDOGREL BISULFATE 75 MILLIGRAM(S): 75 TABLET, FILM COATED ORAL at 11:36

## 2022-07-21 RX ADMIN — APIXABAN 5 MILLIGRAM(S): 2.5 TABLET, FILM COATED ORAL at 05:14

## 2022-07-21 RX ADMIN — Medication 25 MILLIGRAM(S): at 09:58

## 2022-07-21 RX ADMIN — Medication 50 MICROGRAM(S): at 05:14

## 2022-07-21 NOTE — DISCHARGE NOTE PROVIDER - CARE PROVIDER_API CALL
Maximus Ivan (DO)  Cardiovascular Disease; Interventional Cardiology  [address], [address] [place] [address]  Phone: [phone]  Fax: [phone]  Established Patient  Scheduled Appointment: 08/01/2022 01:15 PM   Maximus Ivan  [address] [place], [place]  Phone: [phone]  Fax: (   )    -  Established Patient  Scheduled Appointment: 08/01/2022 01:15 PM

## 2022-07-21 NOTE — DISCHARGE NOTE PROVIDER - NSDCPNSUBOBJ_GEN_ALL_CORE
56/M with h/p CAD s/p PCI (last with atherectomy in 2019), Known reduced EF 40%, A>fib s/p 2 ablation (5 years back) on AC and sotalol, obesity, DM A1c 10, presented for elective LHC and LAD intervention after noted to have positive NST. s/p Brachytherapy and laser atherectomy, PCI of LAD, with post procedural chest pain and ST elevation V1 to V3. Pt post procedure admitted to CCU and started on nitro drip. improved symptomatically.     Plan:  CAD s/p PCI, atherectomy, Brachytherapy to LAD,   post procedure chest pain with EKG changes  Cont DAPT and statin  will stop aspirin after 1 month as he is on triple therapy  cont imdur 30mg po daily  cont ranexa  increase metoprolol 50mg po BID  trend trop to peak    HFmEF ef 45%   metoprolol as above  start lisinopril 10mg po daily  TTE 7/20: ef 50%, basal and mid anterior septum akinesis    A.fib, parx  sinus rhythm  cont NOAC for AC    DM - uncontrolled  endo consult appreciated    discharge home, f/u with Dr. Ivan in 2-4 weeks

## 2022-07-21 NOTE — PROGRESS NOTE ADULT - REASON FOR ADMISSION
Staged PCI, Brachytherapy

## 2022-07-21 NOTE — DISCHARGE NOTE PROVIDER - HOSPITAL COURSE
#Discharge: do not delete    54 y/o male former smoker with PMHx of HTN, HLD, pAfib (s/p DCCV in 2018, on Eliquis, last dose 07/16/2022), pSVT, CAD s/p PCI (most recent laser atherectomy/MARTIN x 2 prox and mid LAD ISR @ [facility] 2/1/19), DM II, and hypothyroidism who presented to cardiologist Dr. Mendelson with reports of BLANCHARD with bicycling and walking uphill which did not improve with addition of Ranexa to his regimen. Pt denies fever, chills, cough, palpitations, CP, orthopnea, LE edema, abdominal pain, N/V/D, dizziness, syncope. Per MD note pt underwent nuclear stress test that was ? false positive. Pt was subsequently referred to [facility] for cardiac cath 7/6/22 which revealed LM luminal irregularities, pLAD 60% ISR, mLAD 80% ISR, prox to mid LCx 30%, prox to mid RCA 30%, EDP 15mmHg. In light of pt’s risk factors, CCS class II anginal symptoms and recurrent ISR of LAD pt is referred to [facility] for laser atherectomy and brachytherapy.  PCI was performed and MARTIN was placed in LAD.   Problem List/Main Diagnoses (system-based):   1) R/o myocardial ischemia s/p PCI I/s/o MARTIN insertion:  Nitro gtt was gradually decreased and discontinued. Trops and CKMB peaked and downtrending, EKG normalized. Stable RBBB was noted on EKG likely 2/2 distal occlusion of septal a. 2/2 PCI manipulation of LAD.  2) Atrial fibrillation: Started Lopressor 50 Q12. Eliquis Q12 was restarted. Home Diltiazam and Sotalol were held.  3) HTN: Pt started on Lisonopril 10 Qd, home Ramipril 10 Qd held  4) HLD: C/w Atorvastatin 80 Qd  5) DM II: Endocrine consulted and they recommended an increase in Lantus to 32U Qd in addition to the rest of his home regimen  6) Hypothyroidism: C/w Levothyroxine 50 mcg Qd    Inpatient treatment course: PCI was performed in [facility] and during the procedure the pt started feeling chest pain. Intravascular US did not show signs of dissection. He was admitted to the CCU for better monitoring. Nitro gtt was gradually decreased and discontinued. Trops and CKMB peaked and downtrending, EKG normalized.     New medications:   DISCONTINUED Home Diltiazam, Sotalol, and Ramipril  STARTED Lopressor 50 Q12, Lisonopril 10Qd    Labs to be followed outpatient: None  Exam to be followed outpatient: None   52 y/o male former smoker with PMHx of HTN, HLD, pAfib (s/p DCCV in 2018, on Eliquis, last dose 07/16/2022), pSVT, CAD s/p PCI (most recent laser atherectomy/MARTIN x 2 prox and mid LAD ISR @ [facility] 2/1/19), DM II, and hypothyroidism who presented to cardiologist Dr. Mendelson with reports of BLANCHARD with bicycling and walking uphill which did not improve with addition of Ranexa to his regimen. Pt denies fever, chills, cough, palpitations, CP, orthopnea, LE edema, abdominal pain, N/V/D, dizziness, syncope. Per MD note pt underwent nuclear stress test that was ? false positive. Pt was subsequently referred to [facility] for cardiac cath 7/6/22 which revealed LM luminal irregularities, pLAD 60% ISR, mLAD 80% ISR, prox to mid LCx 30%, prox to mid RCA 30%, EDP 15mmHg. In light of pt’s risk factors, CCS class II anginal symptoms and recurrent ISR of LAD pt is referred to [facility] for laser atherectomy and brachytherapy.  PCI was performed and MARTIN was placed in LAD.     Problem List/Main Diagnoses (system-based):   1) R/o myocardial ischemia s/p PCI I/s/o MARTIN insertion:  Nitro gtt was gradually decreased and discontinued. Trops and CKMB peaked and downtrending, EKG normalized. Stable RBBB was noted on EKG likely 2/2 distal occlusion of septal a. 2/2 PCI manipulation of LAD.  2) Atrial fibrillation: Started Lopressor 50 Q12. Eliquis Q12 was restarted. Home Diltiazam and Sotalol were discontinued.   3) HTN: Pt started on Lisonopril 10 Qd, home Ramipril 10 Qd held  4) HLD: C/w Atorvastatin 80 Qd  5) DM II: Endocrine consulted and they recommended an increase in Lantus to 38U Qd in addition to the rest of his home regimen  6) Hypothyroidism: C/w Levothyroxine 50 mcg Qd    Inpatient treatment course: PCI was performed in [facility] and during the procedure the pt started feeling chest pain. Intravascular US did not show signs of dissection. He was admitted to the CCU for better monitoring. Nitro gtt was gradually decreased and discontinued. Trops and CKMB peaked and downtrending, EKG normalized.     New medications:   DISCONTINUED Home Diltiazam, Sotalol, and Ramipril  STARTED Lopressor 50 Q12, Lisonopril 10Qd, Lantus 38U

## 2022-07-21 NOTE — DISCHARGE NOTE NURSING/CASE MANAGEMENT/SOCIAL WORK - NSDCFUADDAPPT_GEN_ALL_CORE_FT
Please go to your follow up appointment with your heart doctor (Cardiologist)     Please call our diabetes doctors (Endocrinologists) at  to set up a follow up appointment to better manage your diabetes.

## 2022-07-21 NOTE — DISCHARGE NOTE NURSING/CASE MANAGEMENT/SOCIAL WORK - PATIENT PORTAL LINK FT
You can access the FollowMyHealth Patient Portal offered by [facility] by registering at the following website: [email or web address]. By joining [facility]’s FollowMyHealth portal, you will also be able to view your health information using other applications (apps) compatible with our system.

## 2022-07-21 NOTE — DISCHARGE NOTE PROVIDER - PROVIDER TOKENS
PROVIDER:[TOKEN:[[id number]],SCHEDULEDAPPT:[08/01/2022],SCHEDULEDAPPTTIME:[01:15 PM],ESTABLISHEDPATIENT:[T]] FREE:[LAST:[Mangla],FIRST:[Maximus],PHONE:[phone]],FAX:[(   [phone]    -],ADDRESS:[[address] [place] [address]],SCHEDULEDAPPT:[08/01/2022],SCHEDULEDAPPTTIME:[01:15 PM],ESTABLISHEDPATIENT:[T]]

## 2022-07-21 NOTE — DISCHARGE NOTE PROVIDER - NSDCCPCAREPLAN_GEN_ALL_CORE_FT
PRINCIPAL DISCHARGE DIAGNOSIS  Diagnosis: CAD (coronary artery disease)  Assessment and Plan of Treatment: Coronary artery disease, also called CAD, is a condition that affects your heart. It is the most common heart disease in the [place]. CAD happens when coronary arteries struggle to supply the heart with enough blood, oxygen and nutrients. Cholesterol deposits, or plaques, are almost always to blame. If the coronary arteries are blocked, that can cause heart damage. This was why you had your procedure done to open up some of your coronary arteries. Please see your cardiologist for your follow up appointment to make sure you get the best long term care.      SECONDARY DISCHARGE DIAGNOSES  Diagnosis: DM (diabetes mellitus)  Assessment and Plan of Treatment: Diabetes is a disease that occurs when your blood glucose, also called blood sugar, is too high. Blood glucose is your main source of energy and comes from the food you eat. Insulin, a hormone made by the pancreas, helps glucose from food get into your cells to be used for energy. Sometimes your body doesn’t make enough—or any—insulin or doesn’t use insulin well. Glucose then stays in your blood and doesn’t reach your cells.  Over time, having too much glucose in your blood can cause health problems. This is why youa re taking some medications to keep your blood sugar levels under control. Please continue taking these medications as prescribed and see your endocrinologist for follow up to best manage this condition long term.    Diagnosis: Chronic atrial fibrillation  Assessment and Plan of Treatment: During this hospital admission you were found to be in Atrial Fibrillation. This is an irregular, often rapid heart rate that commonly causes poor blood flow. The heart's upper chambers (atria) beat out of coordination with the lower chambers (ventricles). This condition may have no symptoms, but when symptoms do appear they include palpitations, shortness of breath, and fatigue. Treatments include drugs, electrical shock (cardioversion), and minimally invasive surgery (ablation). Continue to take your blood thinner and rate controlling medications as directed. Please continue to take your medications as prescribed and follow up with your primary care doctor and cardiologist in 10-14 days.       PRINCIPAL DISCHARGE DIAGNOSIS  Diagnosis: CAD (coronary artery disease)  Assessment and Plan of Treatment: Coronary artery disease, also called CAD, is a condition that affects your heart. It is the most common heart disease in the [place]. CAD happens when coronary arteries struggle to supply the heart with enough blood, oxygen and nutrients. Cholesterol deposits, or plaques, are almost always to blame. If the coronary arteries are blocked, that can cause heart damage. This was why you had your procedure done to open up some of your coronary arteries. Please see your cardiologist for your follow up appointment to make sure you get the best long term care.  IMPORTANT: Please stop taking Sotalol, Diltiazem, and Ramipril. We have started you on Lisonipril 10mg Once per day, Lopressor 50mg twice per day, Ranolazine 500mg twice a day, and Imdur 30mg once a day. Plese continue taking these medications and also go to your follow up appointment on 8/1 at 1:15pm at [address] [place] [address].        SECONDARY DISCHARGE DIAGNOSES  Diagnosis: DM (diabetes mellitus)  Assessment and Plan of Treatment: Diabetes is a disease that occurs when your blood glucose, also called blood sugar, is too high. Blood glucose is your main source of energy and comes from the food you eat. Insulin, a hormone made by the pancreas, helps glucose from food get into your cells to be used for energy. Sometimes your body doesn’t make enough—or any—insulin or doesn’t use insulin well. Glucose then stays in your blood and doesn’t reach your cells.  Over time, having too much glucose in your blood can cause health problems. This is why youa re taking some medications to keep your blood sugar levels under control. Please continue taking these medications as prescribed and see your endocrinologist for follow up to best manage this condition long term.  IMPORTANT: We have increased your dose of Lantus at night to 38 units each night, please take this dose. Please call  to make a follow up appointment with a diabetes/Endocrine doctor.    Diagnosis: Chronic atrial fibrillation  Assessment and Plan of Treatment: During this hospital admission you were found to be in Atrial Fibrillation. This is an irregular, often rapid heart rate that commonly causes poor blood flow. The heart's upper chambers (atria) beat out of coordination with the lower chambers (ventricles). This condition may have no symptoms, but when symptoms do appear they include palpitations, shortness of breath, and fatigue. Treatments include drugs, electrical shock (cardioversion), and minimally invasive surgery (ablation). Continue to take your blood thinner and rate controlling medications as directed. Please continue to take your medications as prescribed and follow up with your primary care doctor and cardiologist in 10-14 days.  IMPORTANT: We have started you on Lopressor 50 Twice a day, please take this medication at this dose. Please continue to take Aspirin 81 once a day, clopidogrel 75 once a day, and Eliquis 5mg twice a day. On 8/17, please STOP taking Aspirin.

## 2022-07-21 NOTE — DISCHARGE NOTE PROVIDER - CARE PROVIDERS DIRECT ADDRESSES
,[email or web address] ,[email or web address]

## 2022-07-21 NOTE — DISCHARGE NOTE NURSING/CASE MANAGEMENT/SOCIAL WORK - NSDCPEFALRISK_GEN_ALL_CORE
For information on Fall & Injury Prevention, visit: [email or web address] OR  [email or web address] OR  https://www.cdc.gov/steadi/patient.html

## 2022-07-21 NOTE — DISCHARGE NOTE PROVIDER - NSDCFUADDAPPT_GEN_ALL_CORE_FT
Please go to your follow up appointments with your heart doctor (Cardiologist) and diabetes doctor (Endocrinologist) to best manage your long term care Please go to your follow up appointment with your heart doctor (Cardiologist)     Please call our diabetes doctors (Endocrinologists) at  to set up a follow up appointment to better manage your diabetes.

## 2022-07-21 NOTE — DISCHARGE NOTE PROVIDER - NSDCMRMEDTOKEN_GEN_ALL_CORE_FT
atorvastatin 80 mg oral tablet: 1 tab(s) orally once a day  Cardizem  mg/24 hours oral capsule, extended release: 1 cap(s) orally once a day  clopidogrel 75 mg oral tablet: 1 tab(s) orally once a day  Eliquis 5 mg oral tablet: 1 tab(s) orally 2 times a day  Lantus Solostar Pen 100 units/mL subcutaneous solution: 32 unit(s) subcutaneous once a day (at bedtime)   levothyroxine 50 mcg (0.05 mg) oral tablet: 1 tab(s) orally once a day  metFORMIN 1000 mg oral tablet, extended release: 1 tab(s) orally 2 times a day  NovoLOG FlexPen 100 units/mL injectable solution: 12 unit(s) injectable 3 times a day (before meals)   ramipril 10 mg oral capsule: 1 cap(s) orally once a day  Ranexa 500 mg oral tablet, extended release: 1 tab(s) orally 2 times a day  sotalol 160 mg oral tablet: 1 tab(s) orally 2 times a day   aspirin 81 mg oral delayed release tablet: 1 tab(s) orally once a day for one month. Stop taking on 8/17.  atorvastatin 80 mg oral tablet: 1 tab(s) orally once a day  clopidogrel 75 mg oral tablet: 1 tab(s) orally once a day  Eliquis 5 mg oral tablet: 1 tab(s) orally 2 times a day  isosorbide mononitrate 30 mg oral tablet, extended release: 1 tab(s) orally once a day  Lantus Solostar Pen 100 units/mL subcutaneous solution: 38 unit(s) subcutaneous once a day (at bedtime)   levothyroxine 50 mcg (0.05 mg) oral tablet: 1 tab(s) orally once a day  lisinopril 10 mg oral tablet: 1 tab(s) orally once a day   metFORMIN 1000 mg oral tablet, extended release: 1 tab(s) orally 2 times a day  metoprolol tartrate 50 mg oral tablet: 1 tab(s) orally 2 times a day  NovoLOG FlexPen 100 units/mL injectable solution: 12 unit(s) injectable 3 times a day (before meals)   Ranexa 500 mg oral tablet, extended release: 1 tab(s) orally 2 times a day

## 2022-07-26 DIAGNOSIS — I11.0 HYPERTENSIVE HEART DISEASE WITH HEART FAILURE: ICD-10-CM

## 2022-07-26 DIAGNOSIS — G89.18 OTHER ACUTE POSTPROCEDURAL PAIN: ICD-10-CM

## 2022-07-26 DIAGNOSIS — Z87.891 PERSONAL HISTORY OF NICOTINE DEPENDENCE: ICD-10-CM

## 2022-07-26 DIAGNOSIS — Z95.5 PRESENCE OF CORONARY ANGIOPLASTY IMPLANT AND GRAFT: ICD-10-CM

## 2022-07-26 DIAGNOSIS — Z79.4 LONG TERM (CURRENT) USE OF INSULIN: ICD-10-CM

## 2022-07-26 DIAGNOSIS — I50.22 CHRONIC SYSTOLIC (CONGESTIVE) HEART FAILURE: ICD-10-CM

## 2022-07-26 DIAGNOSIS — I97.190 OTHER POSTPROCEDURAL CARDIAC FUNCTIONAL DISTURBANCES FOLLOWING CARDIAC SURGERY: ICD-10-CM

## 2022-07-26 DIAGNOSIS — E66.9 OBESITY, UNSPECIFIED: ICD-10-CM

## 2022-07-26 DIAGNOSIS — I25.119 ATHEROSCLEROTIC HEART DISEASE OF NATIVE CORONARY ARTERY WITH UNSPECIFIED ANGINA PECTORIS: ICD-10-CM
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
## 2022-07-26 DIAGNOSIS — T82.855A STENOSIS OF CORONARY ARTERY STENT, INITIAL ENCOUNTER: ICD-10-CM

## 2022-07-26 DIAGNOSIS — I25.10 ATHEROSCLEROTIC HEART DISEASE OF NATIVE CORONARY ARTERY WITHOUT ANGINA PECTORIS: ICD-10-CM

## 2022-07-26 DIAGNOSIS — I45.10 UNSPECIFIED RIGHT BUNDLE-BRANCH BLOCK: ICD-10-CM

## 2022-07-26 DIAGNOSIS — E78.5 HYPERLIPIDEMIA, UNSPECIFIED: ICD-10-CM

## 2022-07-26 DIAGNOSIS — R77.8 OTHER SPECIFIED ABNORMALITIES OF PLASMA PROTEINS: ICD-10-CM

## 2022-07-26 DIAGNOSIS — R07.9 CHEST PAIN, UNSPECIFIED: ICD-10-CM

## 2022-07-26 DIAGNOSIS — I25.84 CORONARY ATHEROSCLEROSIS DUE TO CALCIFIED CORONARY LESION: ICD-10-CM

## 2022-07-26 DIAGNOSIS — E03.9 HYPOTHYROIDISM, UNSPECIFIED: ICD-10-CM
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
## 2022-07-26 DIAGNOSIS — E11.65 TYPE 2 DIABETES MELLITUS WITH HYPERGLYCEMIA: ICD-10-CM

## 2022-07-26 DIAGNOSIS — I48.20 CHRONIC ATRIAL FIBRILLATION, UNSPECIFIED: ICD-10-CM

## 2022-07-26 DIAGNOSIS — Z79.01 LONG TERM (CURRENT) USE OF ANTICOAGULANTS: ICD-10-CM

## 2022-07-26 DIAGNOSIS — Z79.84 LONG TERM (CURRENT) USE OF ORAL HYPOGLYCEMIC DRUGS: ICD-10-CM

## 2022-07-29 LAB — ISTAT ACTK (ACTIVATED CLOTTING TIME KAOLIN): 184 SEC — HIGH (ref 74–137)

## 2023-07-29 ENCOUNTER — EMERGENCY (EMERGENCY)
Facility: HOSPITAL | Age: 58
LOS: 1 days | Discharge: HOME | End: 2023-07-29
Attending: EMERGENCY MEDICINE | Admitting: EMERGENCY MEDICINE
Payer: COMMERCIAL

## 2023-07-29 VITALS
OXYGEN SATURATION: 99 % | HEART RATE: 95 BPM | DIASTOLIC BLOOD PRESSURE: 56 MMHG | TEMPERATURE: 98 F | SYSTOLIC BLOOD PRESSURE: 93 MMHG | HEIGHT: 66 IN | RESPIRATION RATE: 18 BRPM | WEIGHT: 197.98 LBS

## 2023-07-29 VITALS
DIASTOLIC BLOOD PRESSURE: 69 MMHG | RESPIRATION RATE: 18 BRPM | OXYGEN SATURATION: 99 % | TEMPERATURE: 98 F | HEART RATE: 89 BPM | SYSTOLIC BLOOD PRESSURE: 116 MMHG

## 2023-07-29 DIAGNOSIS — S02.609A FRACTURE OF MANDIBLE, UNSPECIFIED, INITIAL ENCOUNTER FOR CLOSED FRACTURE: Chronic | ICD-10-CM

## 2023-07-29 DIAGNOSIS — Z98.890 OTHER SPECIFIED POSTPROCEDURAL STATES: Chronic | ICD-10-CM

## 2023-07-29 DIAGNOSIS — Z95.5 PRESENCE OF CORONARY ANGIOPLASTY IMPLANT AND GRAFT: Chronic | ICD-10-CM

## 2023-07-29 DIAGNOSIS — S68.119A COMPLETE TRAUMATIC METACARPOPHALANGEAL AMPUTATION OF UNSPECIFIED FINGER, INITIAL ENCOUNTER: Chronic | ICD-10-CM

## 2023-07-29 LAB
ANION GAP SERPL CALC-SCNC: 11 MMOL/L — SIGNIFICANT CHANGE UP (ref 5–17)
ANION GAP SERPL CALC-SCNC: 11 MMOL/L — SIGNIFICANT CHANGE UP (ref 5–17)
BASOPHILS # BLD AUTO: 0.03 K/UL — SIGNIFICANT CHANGE UP (ref 0–0.2)
BASOPHILS NFR BLD AUTO: 0.6 % — SIGNIFICANT CHANGE UP (ref 0–2)
BUN SERPL-MCNC: 30 MG/DL — HIGH (ref 7–23)
BUN SERPL-MCNC: 31 MG/DL — HIGH (ref 7–23)
CALCIUM SERPL-MCNC: 8.4 MG/DL — SIGNIFICANT CHANGE UP (ref 8.4–10.5)
CALCIUM SERPL-MCNC: 9.6 MG/DL — SIGNIFICANT CHANGE UP (ref 8.4–10.5)
CHLORIDE SERPL-SCNC: 105 MMOL/L — SIGNIFICANT CHANGE UP (ref 96–108)
CHLORIDE SERPL-SCNC: 108 MMOL/L — SIGNIFICANT CHANGE UP (ref 96–108)
CO2 SERPL-SCNC: 22 MMOL/L — SIGNIFICANT CHANGE UP (ref 22–31)
CO2 SERPL-SCNC: 25 MMOL/L — SIGNIFICANT CHANGE UP (ref 22–31)
CREAT SERPL-MCNC: 1.97 MG/DL — HIGH (ref 0.5–1.3)
CREAT SERPL-MCNC: 2.23 MG/DL — HIGH (ref 0.5–1.3)
EGFR: 33 ML/MIN/1.73M2 — LOW
EGFR: 38 ML/MIN/1.73M2 — LOW
EOSINOPHIL # BLD AUTO: 0.03 K/UL — SIGNIFICANT CHANGE UP (ref 0–0.5)
EOSINOPHIL NFR BLD AUTO: 0.6 % — SIGNIFICANT CHANGE UP (ref 0–6)
GLUCOSE SERPL-MCNC: 112 MG/DL — HIGH (ref 70–99)
GLUCOSE SERPL-MCNC: 119 MG/DL — HIGH (ref 70–99)
HCT VFR BLD CALC: 34 % — LOW (ref 39–50)
HGB BLD-MCNC: 11.4 G/DL — LOW (ref 13–17)
IMM GRANULOCYTES NFR BLD AUTO: 0.4 % — SIGNIFICANT CHANGE UP (ref 0–0.9)
LYMPHOCYTES # BLD AUTO: 1.69 K/UL — SIGNIFICANT CHANGE UP (ref 1–3.3)
LYMPHOCYTES # BLD AUTO: 32 % — SIGNIFICANT CHANGE UP (ref 13–44)
MAGNESIUM SERPL-MCNC: 1.5 MG/DL — LOW (ref 1.6–2.6)
MCHC RBC-ENTMCNC: 27.4 PG — SIGNIFICANT CHANGE UP (ref 27–34)
MCHC RBC-ENTMCNC: 33.5 GM/DL — SIGNIFICANT CHANGE UP (ref 32–36)
MCV RBC AUTO: 81.7 FL — SIGNIFICANT CHANGE UP (ref 80–100)
MONOCYTES # BLD AUTO: 0.48 K/UL — SIGNIFICANT CHANGE UP (ref 0–0.9)
MONOCYTES NFR BLD AUTO: 9.1 % — SIGNIFICANT CHANGE UP (ref 2–14)
NEUTROPHILS # BLD AUTO: 3.03 K/UL — SIGNIFICANT CHANGE UP (ref 1.8–7.4)
NEUTROPHILS NFR BLD AUTO: 57.3 % — SIGNIFICANT CHANGE UP (ref 43–77)
NRBC # BLD: 0 /100 WBCS — SIGNIFICANT CHANGE UP (ref 0–0)
PLATELET # BLD AUTO: 260 K/UL — SIGNIFICANT CHANGE UP (ref 150–400)
POTASSIUM SERPL-MCNC: 4.2 MMOL/L — SIGNIFICANT CHANGE UP (ref 3.5–5.3)
POTASSIUM SERPL-MCNC: 5.3 MMOL/L — SIGNIFICANT CHANGE UP (ref 3.5–5.3)
POTASSIUM SERPL-SCNC: 4.2 MMOL/L — SIGNIFICANT CHANGE UP (ref 3.5–5.3)
POTASSIUM SERPL-SCNC: 5.3 MMOL/L — SIGNIFICANT CHANGE UP (ref 3.5–5.3)
RBC # BLD: 4.16 M/UL — LOW (ref 4.2–5.8)
RBC # FLD: 15 % — HIGH (ref 10.3–14.5)
SODIUM SERPL-SCNC: 141 MMOL/L — SIGNIFICANT CHANGE UP (ref 135–145)
SODIUM SERPL-SCNC: 141 MMOL/L — SIGNIFICANT CHANGE UP (ref 135–145)
WBC # BLD: 5.28 K/UL — SIGNIFICANT CHANGE UP (ref 3.8–10.5)
WBC # FLD AUTO: 5.28 K/UL — SIGNIFICANT CHANGE UP (ref 3.8–10.5)

## 2023-07-29 PROCEDURE — 96374 THER/PROPH/DIAG INJ IV PUSH: CPT

## 2023-07-29 PROCEDURE — 36415 COLL VENOUS BLD VENIPUNCTURE: CPT

## 2023-07-29 PROCEDURE — 93005 ELECTROCARDIOGRAM TRACING: CPT

## 2023-07-29 PROCEDURE — 80048 BASIC METABOLIC PNL TOTAL CA: CPT

## 2023-07-29 PROCEDURE — 85025 COMPLETE CBC W/AUTO DIFF WBC: CPT

## 2023-07-29 PROCEDURE — 83735 ASSAY OF MAGNESIUM: CPT

## 2023-07-29 PROCEDURE — 99284 EMERGENCY DEPT VISIT MOD MDM: CPT | Mod: 25

## 2023-07-29 PROCEDURE — 99285 EMERGENCY DEPT VISIT HI MDM: CPT

## 2023-07-29 RX ORDER — LIDOCAINE 4 G/100G
1 CREAM TOPICAL ONCE
Refills: 0 | Status: COMPLETED | OUTPATIENT
Start: 2023-07-29 | End: 2023-07-29

## 2023-07-29 RX ORDER — METHOCARBAMOL 500 MG/1
1500 TABLET, FILM COATED ORAL ONCE
Refills: 0 | Status: COMPLETED | OUTPATIENT
Start: 2023-07-29 | End: 2023-07-29

## 2023-07-29 RX ORDER — MAGNESIUM SULFATE 500 MG/ML
1 VIAL (ML) INJECTION ONCE
Refills: 0 | Status: COMPLETED | OUTPATIENT
Start: 2023-07-29 | End: 2023-07-29

## 2023-07-29 RX ORDER — SODIUM CHLORIDE 9 MG/ML
1000 INJECTION INTRAMUSCULAR; INTRAVENOUS; SUBCUTANEOUS ONCE
Refills: 0 | Status: COMPLETED | OUTPATIENT
Start: 2023-07-29 | End: 2023-07-29

## 2023-07-29 RX ORDER — ACETAMINOPHEN 500 MG
650 TABLET ORAL ONCE
Refills: 0 | Status: COMPLETED | OUTPATIENT
Start: 2023-07-29 | End: 2023-07-29

## 2023-07-29 RX ADMIN — Medication 100 GRAM(S): at 20:59

## 2023-07-29 RX ADMIN — SODIUM CHLORIDE 1000 MILLILITER(S): 9 INJECTION INTRAMUSCULAR; INTRAVENOUS; SUBCUTANEOUS at 19:07

## 2023-07-29 RX ADMIN — Medication 650 MILLIGRAM(S): at 19:06

## 2023-07-29 RX ADMIN — LIDOCAINE 1 PATCH: 4 CREAM TOPICAL at 19:07

## 2023-07-29 RX ADMIN — METHOCARBAMOL 1500 MILLIGRAM(S): 500 TABLET, FILM COATED ORAL at 19:06

## 2023-07-29 NOTE — ED PROVIDER NOTE - OBJECTIVE STATEMENT
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 does admit was sitting leaning against wall while sleeping in the [employer]. has not yet taken analgesia. does not believe there was any tachycardia given his apple watch did NOT alarm. no fever/chills, no uri/cough, no cp/sob, no palpitations, no abd pain/n/v, no diarrhea, no hematochezia/melena, no change in appetite/po intake, no dysuria, no back pain, no extremity numbness/paresthesia/weakness, no unsteady gait/difficulty ambulating, no pedal edema/pain/rash, no trauma, no etoh-dpt/ivdu.     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), c/o episode fatigue/lightheadedness/head fog/mild bl blurry vision while out in the heat 1wk ago and then again today around 12p, improved after he went inside [employer] and slept in dark corner for an hour and then drank "electrolyte drink." currently resolved sx except neck achiness only when engaging neck muscles, otherwise no pain. does admit was sitting leaning against wall while sleeping in the [employer]. has not yet taken analgesia. does not believe there was any tachycardia given his apple watch did NOT alarm. no fever/chills, no uri/cough, no cp/sob, no palpitations, no abd pain/n/v, no diarrhea, no hematochezia/melena, no change in appetite/po intake, no dysuria, no back pain, no extremity numbness/paresthesia/weakness, no unsteady gait/difficulty ambulating, no pedal edema/pain/rash, no trauma, no etoh-dpt/ivdu, no new Rx, no diuretic Rx.    cards: mendelson

## 2023-07-29 NOTE — ED ADULT NURSE NOTE - OBJECTIVE STATEMENT
pt presented to ed with the c/o dizziness, headache, neck pain, blurry vision, as per pt he had this same symptoms x 2 days back which resolved, pt admit of walking in the heat and felt the symptoms, the blurry vision resolved after an hour, pt at present c/o having mild headache / discomfort and when moved he has pain on the neck. Pt on assessment is awake, alert and oriented , strength and sensation is equal in all four extremities, no focal defecit noted, pt remains comfortable in the ed, IV line inserted labs send and medications given as per MD order, will monitor safety maintained.

## 2023-07-29 NOTE — ED PROVIDER NOTE - NSFOLLOWUPINSTRUCTIONS_ED_ALL_ED_FT
AS DISCUSSED, YOU WERE FOUND TO HAVE ACUTE KIDNEY INJURY. IT IS UNCLEAR WHAT CAUSED IT. YOU DO NOT REQUIRE HOSPITALIZATION AT THIS TIME BUT IT IS IMPERATIVE THAT YOU FOLLOW-UP WITH EITHER NEPHROLOGY OR YOUR PCP IF YOU ARE UNABLE TO GET IN TO SEE NEPHROLOGY IN 1-2 DAYS FOR EVALUATION AND REPEAT LABS.    PLEASE RETURN TO THE EMERGENCY DEPARTMENT IF SEVERE HEADACHE, DIZZINESS, PALPITATIONS, LEG SWELLING, DIFFICULTY URINATING/DECREASE URINATION, FEVER, CHEST PAIN, SHORTNESS OF BREATH, ABDOMINAL PAIN, VOMITING, OTHER CONCERNING SYMPTOMS.    PLEASE CONTACT AREN CASTRO ([facility] EMERGENCY DEPARTMENT CLINICAL REFERRAL COORDINATOR) TO ASSIST IN SCHEDULING YOUR FOLLOW-UP APPOINTMENT.    Monday - Friday 11am-7pm  [phone]  [email or web address]

## 2023-07-29 NOTE — ED PROVIDER NOTE - PHYSICAL EXAMINATION
CONST: nontoxic NAD speaking in full sentences  HEAD: atraumatic  EYES: conjunctivae clear, PERRL, EOMI  ENT: mmm  NECK: supple/FROM, no midline ttp/stepoff/deformity, no jvd  CARD: rrr no murmurs  CHEST: ctab no r/r/w  ABD: soft, nd, nttp, no rebound/guarding  EXT: FROM, symmetric distal pulses intact  SKIN: warm, dry, no rash, no pedal edema/ttp/rash, cap refill <2sec  NEURO: a+ox3, 5/5 strength x4, gross sensation intact x4, baseline gait

## 2023-07-29 NOTE — ED PROVIDER NOTE - CLINICAL SUMMARY MEDICAL DECISION MAKING FREE TEXT BOX
hx obtained from pt and chart review. avss. nontoxic. NAD. euvolemic. no systemic sx. no active cp. no acute resp distress. no acute focal neuro deficits. no trauma. no new Rx or nephrotoxic Rx. found to have faviola cr 2.23 (prior 0.82, 7/21/22). mild improvement s/p hydration cr 1.97. no indication for hd at this time. also found to have mild hypomagnesia s/p repletion. no leukocytosis vs significant anemia. sinus ekg w/o ischemic. pt refusing to give urine (see progress note) but states no urinary retention. no recurrent sx. tolerating po. steady gait. requesting to go home. possibly dehydration vs intrinsic faviola. no indication for inpatient hospitalization at this time. will dc w/ outpatient pcp/renal fu. strict return precautions. pt agrees w/ plan. questions answered.

## 2023-07-29 NOTE — ED ADULT TRIAGE NOTE - OTHER COMPLAINTS
Speaking in full sentences without difficulty, denies sob/cp/dizziness/n/v/fever/chills/vision changes now. Hx 2 ablations, on clopidogrel and eliquis

## 2023-07-29 NOTE — ED PROVIDER NOTE - PATIENT PORTAL LINK FT
You can access the FollowMyHealth Patient Portal offered by [facility] by registering at the following website: [email or web address]. By joining [employer]’s FollowMyHealth portal, you will also be able to view your health information using other applications (apps) compatible with our system.

## 2023-07-29 NOTE — ED PROVIDER NOTE - PROGRESS NOTE DETAILS
full capacity. pt became agitated/disrespectful upon hearing that his lab work shows new acute kidney injury. pt reminded that full capacity. pt became agitated/disrespectful towards nursing/MD upon hearing that his lab work shows new acute kidney injury. pt's conduct addressed. pt agrees to act respectfully and appropriately however still refusing to give ua. consents to ivf, repeat bmp and mag repletion. full capacity. pt became agitated/disrespectful towards nursing/MD upon hearing that his lab work shows new acute kidney injury. pt's conduct addressed. pt agrees to act respectfully and appropriately however still refusing to give ua. understands the risk of missed/worsening disease, including poss death. consents to ivf, repeat bmp and mag repletion. full capacity. pt became agitated/disrespectful towards nursing/MD upon hearing that his lab work shows new acute kidney injury. verbally deescalated. pt agrees to act respectfully and appropriately however still refusing to give ua. understands the risk of missed/worsening disease, including poss death. consents to ivf, repeat bmp and mag repletion.

## 2023-07-29 NOTE — ED ADULT NURSE NOTE - JUGULAR VENOUS DISTENTION
Detail Level: Simple Render Risk Assessment In Note?: no Additional Notes: Patient consent was obtained to proceed with the vista and recommended plan of care after discussion of all risks and benefits including the risks of Covid-19 exposure. absent

## 2023-07-29 NOTE — ED ADULT NURSE REASSESSMENT NOTE - NS ED NURSE REASSESS COMMENT FT1
pt received from day shift RN - as per report pt presents to the ED c/o dizziness, blurred vision and neck pain w/ movement. at this time pt is pending repeat BMP after IV fluid infusion. Pt also pending urine collection - however pt states "it is not going to happen for a bit." MD Perez aware and pt made aware that we are pending repeat lab work and results. pt does not appear in acute distress at this time, is resting comfortably on stretcher watching TV. will continue to monitor pt at this time Bexarotene Counseling:  I discussed with the patient the risks of bexarotene including but not limited to hair loss, dry lips/skin/eyes, liver abnormalities, hyperlipidemia, pancreatitis, depression/suicidal ideation, photosensitivity, drug rash/allergic reactions, hypothyroidism, anemia, leukopenia, infection, cataracts, and teratogenicity.  Patient understands that they will need regular blood tests to check lipid profile, liver function tests, white blood cell count, thyroid function tests and pregnancy test if applicable.

## 2023-08-02 DIAGNOSIS — Z79.82 LONG TERM (CURRENT) USE OF ASPIRIN: ICD-10-CM

## 2023-08-02 DIAGNOSIS — Z79.02 LONG TERM (CURRENT) USE OF ANTITHROMBOTICS/ANTIPLATELETS: ICD-10-CM

## 2023-08-02 DIAGNOSIS — R53.83 OTHER FATIGUE: ICD-10-CM

## 2023-08-02 DIAGNOSIS — E78.5 HYPERLIPIDEMIA, UNSPECIFIED: ICD-10-CM

## 2023-08-02 DIAGNOSIS — E10.9 TYPE 1 DIABETES MELLITUS WITHOUT COMPLICATIONS: ICD-10-CM

## 2023-08-02 DIAGNOSIS — H53.8 OTHER VISUAL DISTURBANCES: ICD-10-CM

## 2023-08-02 DIAGNOSIS — R45.1 RESTLESSNESS AND AGITATION: ICD-10-CM

## 2023-08-02 DIAGNOSIS — I48.91 UNSPECIFIED ATRIAL FIBRILLATION: ICD-10-CM

## 2023-08-02 DIAGNOSIS — Z87.891 PERSONAL HISTORY OF NICOTINE DEPENDENCE: ICD-10-CM

## 2023-08-02 DIAGNOSIS — I25.10 ATHEROSCLEROTIC HEART DISEASE OF NATIVE CORONARY ARTERY WITHOUT ANGINA PECTORIS: ICD-10-CM

## 2023-08-02 DIAGNOSIS — R42 DIZZINESS AND GIDDINESS: ICD-10-CM

## 2023-08-02 DIAGNOSIS — Z79.01 LONG TERM (CURRENT) USE OF ANTICOAGULANTS: ICD-10-CM

## 2023-08-02 DIAGNOSIS — I10 ESSENTIAL (PRIMARY) HYPERTENSION: ICD-10-CM

## 2023-08-02 DIAGNOSIS — E03.9 HYPOTHYROIDISM, UNSPECIFIED: ICD-10-CM

## 2023-08-02 DIAGNOSIS — N17.9 ACUTE KIDNEY FAILURE, UNSPECIFIED: ICD-10-CM

## 2024-05-29 NOTE — PROGRESS NOTE ADULT - CRITICAL CARE SERVICES
45 Quality 358: Patient-Centered Surgical Risk Assessment And Communication: Documentation of patient-specific risk assessment with a risk calculator based on multi-institutional clinical data, the specific risk calculator used, and communication of risk assessment from risk calculator with the patient or family. Detail Level: Detailed Quality 226: Preventive Care And Screening: Tobacco Use: Screening And Cessation Intervention: Patient screened for tobacco use and is an ex/non-smoker Quality 47: Advance Care Plan: Advance Care Planning discussed and documented; advance care plan or surrogate decision maker documented in the medical record. Quality 130: Documentation Of Current Medications In The Medical Record: Current Medications Documented